# Patient Record
Sex: FEMALE | Race: WHITE | NOT HISPANIC OR LATINO | Employment: UNEMPLOYED | URBAN - NONMETROPOLITAN AREA
[De-identification: names, ages, dates, MRNs, and addresses within clinical notes are randomized per-mention and may not be internally consistent; named-entity substitution may affect disease eponyms.]

---

## 2017-02-02 LAB
EXTERNAL ABO GROUPING: NORMAL
EXTERNAL ANTIBODY SCREEN: NEGATIVE
EXTERNAL CHLAMYDIA SCREEN: NORMAL
EXTERNAL GONORRHEA SCREEN: NORMAL
EXTERNAL HEPATITIS B SURFACE ANTIGEN: NEGATIVE
EXTERNAL HEPATITIS C AB: NORMAL
EXTERNAL HERPES CULTURE: NORMAL
EXTERNAL RUBELLA QUALITATIVE: NORMAL
EXTERNAL SYPHILIS RPR SCREEN: NEGATIVE
HIV1 AB SPEC QL IA.RAPID: NEGATIVE

## 2017-02-09 ENCOUNTER — TRANSCRIBE ORDERS (OUTPATIENT)
Dept: ADMINISTRATIVE | Facility: HOSPITAL | Age: 27
End: 2017-02-09

## 2017-02-09 ENCOUNTER — APPOINTMENT (OUTPATIENT)
Dept: LAB | Facility: HOSPITAL | Age: 27
End: 2017-02-09
Attending: OBSTETRICS & GYNECOLOGY

## 2017-02-09 ENCOUNTER — TRANSCRIBE ORDERS (OUTPATIENT)
Dept: LABOR AND DELIVERY | Facility: HOSPITAL | Age: 27
End: 2017-02-09

## 2017-02-09 DIAGNOSIS — O36.80X0 PREGNANCY WITH INCONCLUSIVE FETAL VIABILITY, NOT APPLICABLE OR UNSPECIFIED FETUS: Primary | ICD-10-CM

## 2017-02-09 LAB — HCG INTACT+B SERPL-ACNC: 6025.4 MIU/ML (ref 0–10)

## 2017-02-09 PROCEDURE — 36415 COLL VENOUS BLD VENIPUNCTURE: CPT | Performed by: OBSTETRICS & GYNECOLOGY

## 2017-02-09 PROCEDURE — 84702 CHORIONIC GONADOTROPIN TEST: CPT | Performed by: OBSTETRICS & GYNECOLOGY

## 2017-02-13 ENCOUNTER — TRANSCRIBE ORDERS (OUTPATIENT)
Dept: ADMINISTRATIVE | Facility: HOSPITAL | Age: 27
End: 2017-02-13

## 2017-02-13 ENCOUNTER — APPOINTMENT (OUTPATIENT)
Dept: LAB | Facility: HOSPITAL | Age: 27
End: 2017-02-13
Attending: OBSTETRICS & GYNECOLOGY

## 2017-02-13 DIAGNOSIS — O36.80X0 PREGNANCY WITH INCONCLUSIVE FETAL VIABILITY, NOT APPLICABLE OR UNSPECIFIED FETUS: Primary | ICD-10-CM

## 2017-02-13 LAB — HCG INTACT+B SERPL-ACNC: ABNORMAL MIU/ML (ref 0–10)

## 2017-02-13 PROCEDURE — 36415 COLL VENOUS BLD VENIPUNCTURE: CPT | Performed by: OBSTETRICS & GYNECOLOGY

## 2017-02-13 PROCEDURE — 84702 CHORIONIC GONADOTROPIN TEST: CPT | Performed by: OBSTETRICS & GYNECOLOGY

## 2017-07-13 ENCOUNTER — TRANSCRIBE ORDERS (OUTPATIENT)
Dept: ADMINISTRATIVE | Facility: HOSPITAL | Age: 27
End: 2017-07-13

## 2017-07-13 ENCOUNTER — APPOINTMENT (OUTPATIENT)
Dept: LAB | Facility: HOSPITAL | Age: 27
End: 2017-07-13
Attending: OBSTETRICS & GYNECOLOGY

## 2017-07-13 DIAGNOSIS — O26.892 RH NEGATIVE STATUS DURING PREGNANCY IN SECOND TRIMESTER: Primary | ICD-10-CM

## 2017-07-13 DIAGNOSIS — Z67.91 RH NEGATIVE STATUS DURING PREGNANCY IN SECOND TRIMESTER: Primary | ICD-10-CM

## 2017-07-13 LAB
ABO GROUP BLD: NORMAL
BLD GP AB SCN SERPL QL: NEGATIVE
NUMBER OF DOSES: NORMAL
RH BLD: NEGATIVE

## 2017-07-13 PROCEDURE — 36415 COLL VENOUS BLD VENIPUNCTURE: CPT | Performed by: OBSTETRICS & GYNECOLOGY

## 2017-07-13 PROCEDURE — 86900 BLOOD TYPING SEROLOGIC ABO: CPT | Performed by: OBSTETRICS & GYNECOLOGY

## 2017-07-13 PROCEDURE — 86901 BLOOD TYPING SEROLOGIC RH(D): CPT | Performed by: OBSTETRICS & GYNECOLOGY

## 2017-07-13 PROCEDURE — 86850 RBC ANTIBODY SCREEN: CPT | Performed by: OBSTETRICS & GYNECOLOGY

## 2017-07-14 ENCOUNTER — HOSPITAL ENCOUNTER (OUTPATIENT)
Facility: HOSPITAL | Age: 27
Discharge: HOME | End: 2017-07-14
Attending: OBSTETRICS & GYNECOLOGY | Admitting: OBSTETRICS & GYNECOLOGY

## 2017-07-14 PROBLEM — Z29.13 NEED FOR RHOGAM DUE TO RH NEGATIVE MOTHER: Status: ACTIVE | Noted: 2017-07-14

## 2017-07-14 PROCEDURE — 25010000003 RHO D IMMUNE GLOBULIN 1500 UNITS SOLUTION PREFILLED SYRINGE: Performed by: OBSTETRICS & GYNECOLOGY

## 2017-07-14 PROCEDURE — G0378 HOSPITAL OBSERVATION PER HR: HCPCS

## 2017-07-14 PROCEDURE — 96372 THER/PROPH/DIAG INJ SC/IM: CPT

## 2017-07-14 RX ADMIN — HUMAN RHO(D) IMMUNE GLOBULIN 300 MCG: 300 INJECTION, SOLUTION INTRAMUSCULAR at 10:42

## 2017-09-07 LAB — EXTERNAL GROUP B STREP ANTIGEN: NORMAL

## 2017-09-14 ENCOUNTER — HOSPITAL ENCOUNTER (OUTPATIENT)
Facility: HOSPITAL | Age: 27
Setting detail: OBSERVATION
Discharge: HOME | End: 2017-09-14
Attending: OBSTETRICS & GYNECOLOGY | Admitting: OBSTETRICS & GYNECOLOGY

## 2017-09-14 VITALS
TEMPERATURE: 97.7 F | HEIGHT: 62 IN | RESPIRATION RATE: 16 BRPM | BODY MASS INDEX: 36.62 KG/M2 | WEIGHT: 199 LBS | HEART RATE: 91 BPM | DIASTOLIC BLOOD PRESSURE: 79 MMHG | SYSTOLIC BLOOD PRESSURE: 136 MMHG

## 2017-09-14 PROCEDURE — G0463 HOSPITAL OUTPT CLINIC VISIT: HCPCS

## 2017-09-14 PROCEDURE — 59025 FETAL NON-STRESS TEST: CPT

## 2017-09-14 PROCEDURE — G0378 HOSPITAL OBSERVATION PER HR: HCPCS

## 2017-09-14 RX ORDER — ACETAMINOPHEN 325 MG/1
650 TABLET ORAL EVERY 4 HOURS PRN
Status: CANCELLED | OUTPATIENT
Start: 2017-09-14

## 2017-09-14 RX ORDER — FERROUS SULFATE 325(65) MG
325 TABLET ORAL
COMMUNITY

## 2017-09-14 RX ORDER — VALACYCLOVIR HYDROCHLORIDE 500 MG/1
500 TABLET, FILM COATED ORAL DAILY
COMMUNITY
End: 2017-10-01 | Stop reason: HOSPADM

## 2017-09-14 RX ORDER — PRENATAL VIT/IRON FUM/FOLIC AC 27MG-0.8MG
TABLET ORAL DAILY
COMMUNITY

## 2017-09-14 RX ORDER — ACETAMINOPHEN 325 MG/1
650 TABLET ORAL EVERY 4 HOURS PRN
Status: DISCONTINUED | OUTPATIENT
Start: 2017-09-14 | End: 2017-09-14 | Stop reason: HOSPADM

## 2017-09-14 NOTE — NURSING NOTE
Sent from office for NST & serial BPs. FHTs 180s in office. EFM difficult to trace due to fetal activity

## 2017-09-14 NOTE — NON STRESS TEST
Sheila Yeung, a  at 36w0d with an LONG of 10/12/2017, by Patient Reported, was seen at [facility] LABOR DELIVERY for a nonstress test.    Chief Complaint   Patient presents with   • Hypertension     elevated fetal heart rate in the office       Interpretation A  Nonstress Test Interpretation A: Reactive (17 : Cydney Treadwell RN)  Comments A: verified by Brittaney Sheppard MD (17 : Cydney Treadwell RN)

## 2017-09-15 PROBLEM — Z34.90 PREGNANCY: Status: ACTIVE | Noted: 2017-09-15

## 2017-09-21 ENCOUNTER — HOSPITAL ENCOUNTER (OUTPATIENT)
Facility: HOSPITAL | Age: 27
Setting detail: OBSERVATION
Discharge: HOME | End: 2017-09-21
Attending: OBSTETRICS & GYNECOLOGY | Admitting: OBSTETRICS & GYNECOLOGY

## 2017-09-21 VITALS
RESPIRATION RATE: 20 BRPM | DIASTOLIC BLOOD PRESSURE: 84 MMHG | WEIGHT: 200 LBS | HEART RATE: 92 BPM | HEIGHT: 62 IN | SYSTOLIC BLOOD PRESSURE: 147 MMHG | BODY MASS INDEX: 36.8 KG/M2

## 2017-09-21 PROCEDURE — G0378 HOSPITAL OBSERVATION PER HR: HCPCS

## 2017-09-21 PROCEDURE — 59025 FETAL NON-STRESS TEST: CPT

## 2017-09-21 PROCEDURE — G0463 HOSPITAL OUTPT CLINIC VISIT: HCPCS

## 2017-09-21 RX ORDER — ACETAMINOPHEN 325 MG/1
650 TABLET ORAL EVERY 4 HOURS PRN
Status: CANCELLED | OUTPATIENT
Start: 2017-09-21

## 2017-09-25 ENCOUNTER — HOSPITAL ENCOUNTER (OUTPATIENT)
Facility: HOSPITAL | Age: 27
Setting detail: OBSERVATION
Discharge: HOME | End: 2017-09-25
Attending: OBSTETRICS & GYNECOLOGY | Admitting: OBSTETRICS & GYNECOLOGY

## 2017-09-25 VITALS — DIASTOLIC BLOOD PRESSURE: 84 MMHG | SYSTOLIC BLOOD PRESSURE: 159 MMHG | HEART RATE: 88 BPM

## 2017-09-25 RX ORDER — ACETAMINOPHEN 325 MG/1
650 TABLET ORAL EVERY 4 HOURS PRN
Status: DISCONTINUED | OUTPATIENT
Start: 2017-09-25 | End: 2017-09-25 | Stop reason: HOSPADM

## 2017-09-25 RX ORDER — ACETAMINOPHEN 325 MG/1
650 TABLET ORAL EVERY 4 HOURS PRN
Status: DISCONTINUED | OUTPATIENT
Start: 2017-09-25 | End: 2017-09-25 | Stop reason: SDUPTHER

## 2017-09-25 RX ORDER — ACETAMINOPHEN 325 MG/1
650 TABLET ORAL EVERY 4 HOURS PRN
Status: CANCELLED | OUTPATIENT
Start: 2017-09-25

## 2017-09-28 ENCOUNTER — ANESTHESIA (OUTPATIENT)
Dept: LABOR AND DELIVERY | Facility: HOSPITAL | Age: 27
End: 2017-09-28

## 2017-09-28 ENCOUNTER — ANESTHESIA EVENT (OUTPATIENT)
Dept: LABOR AND DELIVERY | Facility: HOSPITAL | Age: 27
End: 2017-09-28

## 2017-09-28 ENCOUNTER — HOSPITAL ENCOUNTER (INPATIENT)
Facility: HOSPITAL | Age: 27
LOS: 3 days | Discharge: HOME | End: 2017-10-01
Attending: OBSTETRICS & GYNECOLOGY | Admitting: OBSTETRICS & GYNECOLOGY

## 2017-09-28 DIAGNOSIS — Z3A.37 37 WEEKS GESTATION OF PREGNANCY: ICD-10-CM

## 2017-09-28 PROBLEM — O14.90 PREECLAMPSIA: Status: ACTIVE | Noted: 2017-09-28

## 2017-09-28 LAB
ABO GROUP BLD: NORMAL
ANISOCYTOSIS BLD QL: ABNORMAL
BASOPHILS # BLD MANUAL: 0.16 10*3/MM3 (ref 0–0.2)
BASOPHILS NFR BLD AUTO: 2 % (ref 0–2)
BLD GP AB SCN SERPL QL: POSITIVE
DEPRECATED RDW RBC AUTO: 39.5 FL (ref 40–54)
EOSINOPHIL # BLD MANUAL: 0.24 10*3/MM3 (ref 0–0.7)
EOSINOPHIL NFR BLD MANUAL: 3 % (ref 0–4)
ERYTHROCYTE [DISTWIDTH] IN BLOOD BY AUTOMATED COUNT: 13.2 % (ref 12–15)
HCT VFR BLD AUTO: 32.5 % (ref 37–47)
HGB BLD-MCNC: 10.9 G/DL (ref 12–16)
HYPOCHROMIA BLD QL: ABNORMAL
LYMPHOCYTES # BLD MANUAL: 0.98 10*3/MM3 (ref 0.72–4.86)
LYMPHOCYTES NFR BLD MANUAL: 12 % (ref 15–45)
LYMPHOCYTES NFR BLD MANUAL: 2 % (ref 4–12)
MCH RBC QN AUTO: 27.7 PG (ref 28–32)
MCHC RBC AUTO-ENTMCNC: 33.5 G/DL (ref 33–36)
MCV RBC AUTO: 82.5 FL (ref 82–98)
MONOCYTES # BLD AUTO: 0.16 10*3/MM3 (ref 0.19–1.3)
NEUTROPHILS # BLD AUTO: 6.6 10*3/MM3 (ref 1.87–8.4)
NEUTROPHILS NFR BLD MANUAL: 81 % (ref 39–78)
NRBC SPEC MANUAL: 1 /100 WBC (ref 0–0)
PLAT MORPH BLD: NORMAL
PLATELET # BLD AUTO: 146 10*3/MM3 (ref 130–400)
PMV BLD AUTO: 12.9 FL (ref 6–12)
RBC # BLD AUTO: 3.94 10*6/MM3 (ref 4.2–5.4)
RH BLD: NEGATIVE
WBC MORPH BLD: NORMAL
WBC NRBC COR # BLD: 8.15 10*3/MM3 (ref 4.8–10.8)

## 2017-09-28 PROCEDURE — 86870 RBC ANTIBODY IDENTIFICATION: CPT | Performed by: OBSTETRICS & GYNECOLOGY

## 2017-09-28 PROCEDURE — 25010000002 FENTANYL CITRATE (PF) 100 MCG/2ML SOLUTION: Performed by: NURSE ANESTHETIST, CERTIFIED REGISTERED

## 2017-09-28 PROCEDURE — 85027 COMPLETE CBC AUTOMATED: CPT | Performed by: OBSTETRICS & GYNECOLOGY

## 2017-09-28 PROCEDURE — C1765 ADHESION BARRIER: HCPCS | Performed by: OBSTETRICS & GYNECOLOGY

## 2017-09-28 PROCEDURE — 86850 RBC ANTIBODY SCREEN: CPT | Performed by: OBSTETRICS & GYNECOLOGY

## 2017-09-28 PROCEDURE — 25010000002 CEFEPIME: Performed by: OBSTETRICS & GYNECOLOGY

## 2017-09-28 PROCEDURE — 25010000002 ONDANSETRON PER 1 MG: Performed by: OBSTETRICS & GYNECOLOGY

## 2017-09-28 PROCEDURE — 88307 TISSUE EXAM BY PATHOLOGIST: CPT | Performed by: OBSTETRICS & GYNECOLOGY

## 2017-09-28 PROCEDURE — 86900 BLOOD TYPING SEROLOGIC ABO: CPT | Performed by: OBSTETRICS & GYNECOLOGY

## 2017-09-28 PROCEDURE — 25010000002 HYDROMORPHONE PER 4 MG: Performed by: NURSE ANESTHETIST, CERTIFIED REGISTERED

## 2017-09-28 PROCEDURE — 85007 BL SMEAR W/DIFF WBC COUNT: CPT | Performed by: OBSTETRICS & GYNECOLOGY

## 2017-09-28 PROCEDURE — 25010000002 MORPHINE SULFATE (PF) 2 MG/ML SOLUTION: Performed by: OBSTETRICS & GYNECOLOGY

## 2017-09-28 PROCEDURE — 86901 BLOOD TYPING SEROLOGIC RH(D): CPT | Performed by: OBSTETRICS & GYNECOLOGY

## 2017-09-28 PROCEDURE — 25010000003 CEFAZOLIN PER 500 MG: Performed by: OBSTETRICS & GYNECOLOGY

## 2017-09-28 RX ORDER — ONDANSETRON 4 MG/1
4 TABLET, FILM COATED ORAL EVERY 6 HOURS PRN
Status: DISCONTINUED | OUTPATIENT
Start: 2017-09-28 | End: 2017-10-01 | Stop reason: HOSPADM

## 2017-09-28 RX ORDER — PRENATAL VIT/IRON FUM/FOLIC AC 27MG-0.8MG
1 TABLET ORAL DAILY
Status: DISCONTINUED | OUTPATIENT
Start: 2017-09-29 | End: 2017-10-01 | Stop reason: HOSPADM

## 2017-09-28 RX ORDER — FENTANYL CITRATE 50 UG/ML
INJECTION, SOLUTION INTRAMUSCULAR; INTRAVENOUS AS NEEDED
Status: DISCONTINUED | OUTPATIENT
Start: 2017-09-28 | End: 2017-09-28 | Stop reason: SURG

## 2017-09-28 RX ORDER — PROMETHAZINE HYDROCHLORIDE 25 MG/1
12.5 TABLET ORAL EVERY 6 HOURS PRN
Status: DISCONTINUED | OUTPATIENT
Start: 2017-09-28 | End: 2017-09-29 | Stop reason: HOSPADM

## 2017-09-28 RX ORDER — TRISODIUM CITRATE DIHYDRATE AND CITRIC ACID MONOHYDRATE 500; 334 MG/5ML; MG/5ML
15 SOLUTION ORAL ONCE
Status: COMPLETED | OUTPATIENT
Start: 2017-09-28 | End: 2017-09-28

## 2017-09-28 RX ORDER — PROMETHAZINE HYDROCHLORIDE 25 MG/ML
12.5 INJECTION, SOLUTION INTRAMUSCULAR; INTRAVENOUS EVERY 6 HOURS PRN
Status: DISCONTINUED | OUTPATIENT
Start: 2017-09-28 | End: 2017-10-01 | Stop reason: HOSPADM

## 2017-09-28 RX ORDER — ONDANSETRON 2 MG/ML
4 INJECTION INTRAMUSCULAR; INTRAVENOUS EVERY 6 HOURS PRN
Status: DISCONTINUED | OUTPATIENT
Start: 2017-09-28 | End: 2017-10-01 | Stop reason: HOSPADM

## 2017-09-28 RX ORDER — PROMETHAZINE HYDROCHLORIDE 25 MG/ML
12.5 INJECTION, SOLUTION INTRAMUSCULAR; INTRAVENOUS EVERY 6 HOURS PRN
Status: DISCONTINUED | OUTPATIENT
Start: 2017-09-28 | End: 2017-09-28 | Stop reason: RX

## 2017-09-28 RX ORDER — CARBOPROST TROMETHAMINE 250 UG/ML
250 INJECTION, SOLUTION INTRAMUSCULAR AS NEEDED
Status: DISCONTINUED | OUTPATIENT
Start: 2017-09-28 | End: 2017-09-29 | Stop reason: HOSPADM

## 2017-09-28 RX ORDER — PROMETHAZINE HYDROCHLORIDE 12.5 MG/1
12.5 SUPPOSITORY RECTAL EVERY 6 HOURS PRN
Status: DISCONTINUED | OUTPATIENT
Start: 2017-09-28 | End: 2017-09-29 | Stop reason: HOSPADM

## 2017-09-28 RX ORDER — OXYCODONE HYDROCHLORIDE AND ACETAMINOPHEN 5; 325 MG/1; MG/1
1 TABLET ORAL EVERY 6 HOURS PRN
Status: DISCONTINUED | OUTPATIENT
Start: 2017-09-29 | End: 2017-10-01 | Stop reason: HOSPADM

## 2017-09-28 RX ORDER — MAGNESIUM SULFATE HEPTAHYDRATE 40 MG/ML
2 INJECTION, SOLUTION INTRAVENOUS CONTINUOUS
Status: DISPENSED | OUTPATIENT
Start: 2017-09-28 | End: 2017-09-30

## 2017-09-28 RX ORDER — FAMOTIDINE 10 MG/ML
20 INJECTION, SOLUTION INTRAVENOUS ONCE AS NEEDED
Status: COMPLETED | OUTPATIENT
Start: 2017-09-28 | End: 2017-09-28

## 2017-09-28 RX ORDER — PROMETHAZINE HYDROCHLORIDE 25 MG/1
25 TABLET ORAL EVERY 6 HOURS PRN
Status: DISCONTINUED | OUTPATIENT
Start: 2017-09-28 | End: 2017-10-01 | Stop reason: HOSPADM

## 2017-09-28 RX ORDER — MAGNESIUM SULFATE HEPTAHYDRATE 40 MG/ML
4 INJECTION, SOLUTION INTRAVENOUS ONCE
Status: COMPLETED | OUTPATIENT
Start: 2017-09-28 | End: 2017-09-28

## 2017-09-28 RX ORDER — OXYCODONE AND ACETAMINOPHEN 7.5; 325 MG/1; MG/1
2 TABLET ORAL EVERY 4 HOURS PRN
Status: DISPENSED | OUTPATIENT
Start: 2017-09-28 | End: 2017-09-29

## 2017-09-28 RX ORDER — ONDANSETRON 2 MG/ML
4 INJECTION INTRAMUSCULAR; INTRAVENOUS EVERY 6 HOURS PRN
Status: DISCONTINUED | OUTPATIENT
Start: 2017-09-28 | End: 2017-09-29 | Stop reason: HOSPADM

## 2017-09-28 RX ORDER — LIDOCAINE HYDROCHLORIDE 10 MG/ML
INJECTION, SOLUTION EPIDURAL; INFILTRATION; INTRACAUDAL; PERINEURAL AS NEEDED
Status: DISCONTINUED | OUTPATIENT
Start: 2017-09-28 | End: 2017-09-28 | Stop reason: SURG

## 2017-09-28 RX ORDER — BISACODYL 10 MG
10 SUPPOSITORY, RECTAL RECTAL DAILY PRN
Status: DISCONTINUED | OUTPATIENT
Start: 2017-09-28 | End: 2017-10-01 | Stop reason: HOSPADM

## 2017-09-28 RX ORDER — SENNA AND DOCUSATE SODIUM 50; 8.6 MG/1; MG/1
1 TABLET, FILM COATED ORAL 2 TIMES DAILY
Status: DISCONTINUED | OUTPATIENT
Start: 2017-09-29 | End: 2017-10-01 | Stop reason: HOSPADM

## 2017-09-28 RX ORDER — KETOROLAC TROMETHAMINE 30 MG/ML
30 INJECTION, SOLUTION INTRAMUSCULAR; INTRAVENOUS EVERY 6 HOURS
Status: COMPLETED | OUTPATIENT
Start: 2017-09-29 | End: 2017-09-29

## 2017-09-28 RX ORDER — LIDOCAINE HYDROCHLORIDE 10 MG/ML
5 INJECTION, SOLUTION INFILTRATION; PERINEURAL AS NEEDED
Status: DISCONTINUED | OUTPATIENT
Start: 2017-09-28 | End: 2017-09-29

## 2017-09-28 RX ORDER — SODIUM CHLORIDE 0.9 % (FLUSH) 0.9 %
1-10 SYRINGE (ML) INJECTION AS NEEDED
Status: DISCONTINUED | OUTPATIENT
Start: 2017-09-28 | End: 2017-10-01 | Stop reason: HOSPADM

## 2017-09-28 RX ORDER — SODIUM CHLORIDE 0.9 % (FLUSH) 0.9 %
1-10 SYRINGE (ML) INJECTION AS NEEDED
Status: DISCONTINUED | OUTPATIENT
Start: 2017-09-28 | End: 2017-09-29 | Stop reason: HOSPADM

## 2017-09-28 RX ORDER — ONDANSETRON 4 MG/1
4 TABLET, ORALLY DISINTEGRATING ORAL EVERY 6 HOURS PRN
Status: DISCONTINUED | OUTPATIENT
Start: 2017-09-28 | End: 2017-09-29 | Stop reason: HOSPADM

## 2017-09-28 RX ORDER — IBUPROFEN 800 MG/1
800 TABLET ORAL EVERY 8 HOURS PRN
Status: DISCONTINUED | OUTPATIENT
Start: 2017-09-30 | End: 2017-10-01 | Stop reason: HOSPADM

## 2017-09-28 RX ORDER — SODIUM CHLORIDE, SODIUM LACTATE, POTASSIUM CHLORIDE, CALCIUM CHLORIDE 600; 310; 30; 20 MG/100ML; MG/100ML; MG/100ML; MG/100ML
125 INJECTION, SOLUTION INTRAVENOUS CONTINUOUS
Status: DISCONTINUED | OUTPATIENT
Start: 2017-09-28 | End: 2017-09-28 | Stop reason: SDUPTHER

## 2017-09-28 RX ORDER — IBUPROFEN 800 MG/1
800 TABLET ORAL EVERY 8 HOURS PRN
Status: DISCONTINUED | OUTPATIENT
Start: 2017-09-28 | End: 2017-09-29 | Stop reason: HOSPADM

## 2017-09-28 RX ORDER — MISOPROSTOL 200 UG/1
800 TABLET ORAL ONCE AS NEEDED
Status: ACTIVE | OUTPATIENT
Start: 2017-09-28 | End: 2017-09-29

## 2017-09-28 RX ORDER — BISACODYL 5 MG/1
10 TABLET, DELAYED RELEASE ORAL DAILY PRN
Status: DISCONTINUED | OUTPATIENT
Start: 2017-09-28 | End: 2017-10-01 | Stop reason: HOSPADM

## 2017-09-28 RX ORDER — ONDANSETRON 4 MG/1
4 TABLET, FILM COATED ORAL EVERY 6 HOURS PRN
Status: DISCONTINUED | OUTPATIENT
Start: 2017-09-28 | End: 2017-09-29 | Stop reason: HOSPADM

## 2017-09-28 RX ORDER — OXYCODONE AND ACETAMINOPHEN 7.5; 325 MG/1; MG/1
1 TABLET ORAL EVERY 4 HOURS PRN
Status: DISPENSED | OUTPATIENT
Start: 2017-09-28 | End: 2017-09-29

## 2017-09-28 RX ORDER — SODIUM CHLORIDE 0.9 % (FLUSH) 0.9 %
1-10 SYRINGE (ML) INJECTION AS NEEDED
Status: DISCONTINUED | OUTPATIENT
Start: 2017-09-28 | End: 2017-09-28 | Stop reason: SDUPTHER

## 2017-09-28 RX ORDER — CARBOPROST TROMETHAMINE 250 UG/ML
250 INJECTION, SOLUTION INTRAMUSCULAR
Status: ACTIVE | OUTPATIENT
Start: 2017-09-28 | End: 2017-09-29

## 2017-09-28 RX ORDER — SODIUM CHLORIDE, SODIUM LACTATE, POTASSIUM CHLORIDE, CALCIUM CHLORIDE 600; 310; 30; 20 MG/100ML; MG/100ML; MG/100ML; MG/100ML
125 INJECTION, SOLUTION INTRAVENOUS CONTINUOUS
Status: DISCONTINUED | OUTPATIENT
Start: 2017-09-29 | End: 2017-10-01 | Stop reason: HOSPADM

## 2017-09-28 RX ORDER — OXYCODONE HYDROCHLORIDE AND ACETAMINOPHEN 5; 325 MG/1; MG/1
2 TABLET ORAL EVERY 6 HOURS PRN
Status: DISCONTINUED | OUTPATIENT
Start: 2017-09-29 | End: 2017-10-01 | Stop reason: HOSPADM

## 2017-09-28 RX ORDER — METHYLERGONOVINE MALEATE 0.2 MG/ML
200 INJECTION INTRAVENOUS AS NEEDED
Status: DISCONTINUED | OUTPATIENT
Start: 2017-09-28 | End: 2017-09-29 | Stop reason: HOSPADM

## 2017-09-28 RX ORDER — HYDROMORPHONE HCL 110MG/55ML
PATIENT CONTROLLED ANALGESIA SYRINGE INTRAVENOUS AS NEEDED
Status: DISCONTINUED | OUTPATIENT
Start: 2017-09-28 | End: 2017-09-28 | Stop reason: SURG

## 2017-09-28 RX ORDER — BUTORPHANOL TARTRATE 1 MG/ML
0.5 INJECTION, SOLUTION INTRAMUSCULAR; INTRAVENOUS EVERY 6 HOURS PRN
Status: DISCONTINUED | OUTPATIENT
Start: 2017-09-28 | End: 2017-10-01 | Stop reason: HOSPADM

## 2017-09-28 RX ORDER — ONDANSETRON 4 MG/1
4 TABLET, ORALLY DISINTEGRATING ORAL EVERY 6 HOURS PRN
Status: DISCONTINUED | OUTPATIENT
Start: 2017-09-28 | End: 2017-10-01 | Stop reason: HOSPADM

## 2017-09-28 RX ORDER — DOCUSATE SODIUM 100 MG/1
100 CAPSULE, LIQUID FILLED ORAL 2 TIMES DAILY PRN
Status: DISCONTINUED | OUTPATIENT
Start: 2017-09-28 | End: 2017-10-01 | Stop reason: HOSPADM

## 2017-09-28 RX ORDER — MORPHINE SULFATE 2 MG/ML
2 INJECTION, SOLUTION INTRAMUSCULAR; INTRAVENOUS
Status: DISCONTINUED | OUTPATIENT
Start: 2017-09-28 | End: 2017-09-29 | Stop reason: ALTCHOICE

## 2017-09-28 RX ORDER — OXYTOCIN 10 [USP'U]/ML
INJECTION, SOLUTION INTRAMUSCULAR; INTRAVENOUS AS NEEDED
Status: DISCONTINUED | OUTPATIENT
Start: 2017-09-28 | End: 2017-09-28 | Stop reason: SURG

## 2017-09-28 RX ORDER — SODIUM CHLORIDE, SODIUM LACTATE, POTASSIUM CHLORIDE, CALCIUM CHLORIDE 600; 310; 30; 20 MG/100ML; MG/100ML; MG/100ML; MG/100ML
125 INJECTION, SOLUTION INTRAVENOUS CONTINUOUS
Status: DISCONTINUED | OUTPATIENT
Start: 2017-09-28 | End: 2017-09-29 | Stop reason: HOSPADM

## 2017-09-28 RX ORDER — BUPIVACAINE HYDROCHLORIDE 7.5 MG/ML
INJECTION, SOLUTION EPIDURAL; RETROBULBAR AS NEEDED
Status: DISCONTINUED | OUTPATIENT
Start: 2017-09-28 | End: 2017-09-28 | Stop reason: SURG

## 2017-09-28 RX ORDER — NALBUPHINE HCL 10 MG/ML
2.5 AMPUL (ML) INJECTION EVERY 4 HOURS PRN
Status: ACTIVE | OUTPATIENT
Start: 2017-09-28 | End: 2017-09-29

## 2017-09-28 RX ORDER — POLYETHYLENE GLYCOL 3350 17 G/17G
17 POWDER, FOR SOLUTION ORAL DAILY
Status: DISCONTINUED | OUTPATIENT
Start: 2017-09-29 | End: 2017-10-01 | Stop reason: HOSPADM

## 2017-09-28 RX ORDER — METHYLERGONOVINE MALEATE 0.2 MG/ML
200 INJECTION INTRAVENOUS
Status: ACTIVE | OUTPATIENT
Start: 2017-09-28 | End: 2017-09-29

## 2017-09-28 RX ORDER — PROMETHAZINE HYDROCHLORIDE 12.5 MG/1
12.5 SUPPOSITORY RECTAL EVERY 6 HOURS PRN
Status: DISCONTINUED | OUTPATIENT
Start: 2017-09-28 | End: 2017-10-01 | Stop reason: HOSPADM

## 2017-09-28 RX ORDER — MISOPROSTOL 200 UG/1
800 TABLET ORAL AS NEEDED
Status: DISCONTINUED | OUTPATIENT
Start: 2017-09-28 | End: 2017-09-29 | Stop reason: HOSPADM

## 2017-09-28 RX ORDER — NALOXONE HCL 0.4 MG/ML
0.4 VIAL (ML) INJECTION
Status: ACTIVE | OUTPATIENT
Start: 2017-09-28 | End: 2017-09-29

## 2017-09-28 RX ORDER — HYDROCODONE BITARTRATE AND ACETAMINOPHEN 5; 325 MG/1; MG/1
1 TABLET ORAL EVERY 4 HOURS PRN
Status: DISCONTINUED | OUTPATIENT
Start: 2017-09-28 | End: 2017-09-29 | Stop reason: ALTCHOICE

## 2017-09-28 RX ADMIN — SODIUM CHLORIDE, POTASSIUM CHLORIDE, SODIUM LACTATE AND CALCIUM CHLORIDE: 600; 310; 30; 20 INJECTION, SOLUTION INTRAVENOUS at 20:25

## 2017-09-28 RX ADMIN — HYDROMORPHONE HYDROCHLORIDE 200 MCG: 2 INJECTION, SOLUTION INTRAMUSCULAR; INTRAVENOUS; SUBCUTANEOUS at 19:58

## 2017-09-28 RX ADMIN — METRONIDAZOLE 500 MG: 500 INJECTION, SOLUTION INTRAVENOUS at 23:15

## 2017-09-28 RX ADMIN — CEFEPIME 2 G: 2 INJECTION, POWDER, FOR SOLUTION INTRAVENOUS at 22:35

## 2017-09-28 RX ADMIN — FENTANYL CITRATE 20 MCG: 50 INJECTION, SOLUTION INTRAMUSCULAR; INTRAVENOUS at 19:58

## 2017-09-28 RX ADMIN — CEFAZOLIN SODIUM 2 G: 2 SOLUTION INTRAVENOUS at 19:55

## 2017-09-28 RX ADMIN — OXYTOCIN 20 UNITS: 10 INJECTION INTRAVENOUS at 20:25

## 2017-09-28 RX ADMIN — MAGNESIUM SULFATE HEPTAHYDRATE 4 G: 40 INJECTION, SOLUTION INTRAVENOUS at 21:22

## 2017-09-28 RX ADMIN — SODIUM CHLORIDE, POTASSIUM CHLORIDE, SODIUM LACTATE AND CALCIUM CHLORIDE: 600; 310; 30; 20 INJECTION, SOLUTION INTRAVENOUS at 19:50

## 2017-09-28 RX ADMIN — OXYTOCIN 30 UNITS: 10 INJECTION INTRAVENOUS at 20:14

## 2017-09-28 RX ADMIN — SODIUM CITRATE AND CITRIC ACID MONOHYDRATE 15 ML: 500; 334 SOLUTION ORAL at 19:42

## 2017-09-28 RX ADMIN — SODIUM CHLORIDE, POTASSIUM CHLORIDE, SODIUM LACTATE AND CALCIUM CHLORIDE 1000 ML: 600; 310; 30; 20 INJECTION, SOLUTION INTRAVENOUS at 17:35

## 2017-09-28 RX ADMIN — SODIUM CHLORIDE, POTASSIUM CHLORIDE, SODIUM LACTATE AND CALCIUM CHLORIDE 75 ML/HR: 600; 310; 30; 20 INJECTION, SOLUTION INTRAVENOUS at 22:34

## 2017-09-28 RX ADMIN — MORPHINE SULFATE 2 MG: 2 INJECTION, SOLUTION INTRAMUSCULAR; INTRAVENOUS at 22:41

## 2017-09-28 RX ADMIN — LIDOCAINE HYDROCHLORIDE 3 ML: 10 INJECTION, SOLUTION EPIDURAL; INFILTRATION; INTRACAUDAL; PERINEURAL at 19:56

## 2017-09-28 RX ADMIN — FAMOTIDINE 20 MG: 10 INJECTION, SOLUTION INTRAVENOUS at 19:42

## 2017-09-28 RX ADMIN — BUPIVACAINE HYDROCHLORIDE 1.7 ML: 7.5 INJECTION, SOLUTION EPIDURAL; RETROBULBAR at 19:58

## 2017-09-28 RX ADMIN — ONDANSETRON 4 MG: 2 INJECTION INTRAMUSCULAR; INTRAVENOUS at 21:42

## 2017-09-28 NOTE — ANESTHESIA PREPROCEDURE EVALUATION
Anesthesia Evaluation     no history of anesthetic complications:  NPO Solid Status: > 6 hours  NPO Liquid Status: > 2 hours     Airway   Mallampati: I  TM distance: >3 FB  Neck ROM: full  no difficulty expected  Dental - normal exam     Pulmonary - normal exam   Cardiovascular - normal exam  Exercise tolerance: excellent (>7 METS)    (+) hypertension poorly controlled,       Neuro/Psych- negative ROS  GI/Hepatic/Renal/Endo    (+) obesity,      Musculoskeletal (-) negative ROS    Abdominal  - normal exam   Substance History - negative use     OB/GYN    (+) Pregnant, Preeclampsia, pregnancy induced hypertension        Other - negative ROS                                       Anesthesia Plan    ASA 2     spinal     Anesthetic plan and risks discussed with patient and spouse/significant other.

## 2017-09-29 LAB
ABO GROUP BLD: NORMAL
BASOPHILS # BLD AUTO: 0.01 10*3/MM3 (ref 0–0.2)
BASOPHILS NFR BLD AUTO: 0.1 % (ref 0–2)
BLD GP AB SCN SERPL QL: NEGATIVE
DEPRECATED RDW RBC AUTO: 39.2 FL (ref 40–54)
EOSINOPHIL # BLD AUTO: 0.01 10*3/MM3 (ref 0–0.7)
EOSINOPHIL NFR BLD AUTO: 0.1 % (ref 0–4)
ERYTHROCYTE [DISTWIDTH] IN BLOOD BY AUTOMATED COUNT: 13.1 % (ref 12–15)
FETAL BLEED: NEGATIVE
HCT VFR BLD AUTO: 28.4 % (ref 37–47)
HGB BLD-MCNC: 9.6 G/DL (ref 12–16)
IMM GRANULOCYTES # BLD: 0.08 10*3/MM3 (ref 0–0.03)
IMM GRANULOCYTES NFR BLD: 0.7 % (ref 0–5)
LYMPHOCYTES # BLD AUTO: 0.83 10*3/MM3 (ref 0.72–4.86)
LYMPHOCYTES NFR BLD AUTO: 7.6 % (ref 15–45)
MAGNESIUM SERPL-MCNC: 5.8 MG/DL (ref 1.4–2.2)
MCH RBC QN AUTO: 27.7 PG (ref 28–32)
MCHC RBC AUTO-ENTMCNC: 33.8 G/DL (ref 33–36)
MCV RBC AUTO: 81.8 FL (ref 82–98)
MONOCYTES # BLD AUTO: 0.51 10*3/MM3 (ref 0.19–1.3)
MONOCYTES NFR BLD AUTO: 4.7 % (ref 4–12)
NEUTROPHILS # BLD AUTO: 9.47 10*3/MM3 (ref 1.87–8.4)
NEUTROPHILS NFR BLD AUTO: 86.8 % (ref 39–78)
NUMBER OF DOSES: NORMAL
PLATELET # BLD AUTO: 142 10*3/MM3 (ref 130–400)
PMV BLD AUTO: 12.5 FL (ref 6–12)
RBC # BLD AUTO: 3.47 10*6/MM3 (ref 4.2–5.4)
RESIDUAL RHIG DETECTED: NORMAL
RH BLD: NEGATIVE
WBC NRBC COR # BLD: 10.91 10*3/MM3 (ref 4.8–10.8)

## 2017-09-29 PROCEDURE — 86850 RBC ANTIBODY SCREEN: CPT

## 2017-09-29 PROCEDURE — 83735 ASSAY OF MAGNESIUM: CPT | Performed by: OBSTETRICS & GYNECOLOGY

## 2017-09-29 PROCEDURE — 86900 BLOOD TYPING SEROLOGIC ABO: CPT | Performed by: OBSTETRICS & GYNECOLOGY

## 2017-09-29 PROCEDURE — 51703 INSERT BLADDER CATH COMPLEX: CPT

## 2017-09-29 PROCEDURE — 25010000002 CEFEPIME: Performed by: OBSTETRICS & GYNECOLOGY

## 2017-09-29 PROCEDURE — 85025 COMPLETE CBC W/AUTO DIFF WBC: CPT | Performed by: OBSTETRICS & GYNECOLOGY

## 2017-09-29 PROCEDURE — 85461 HEMOGLOBIN FETAL: CPT | Performed by: OBSTETRICS & GYNECOLOGY

## 2017-09-29 PROCEDURE — 94799 UNLISTED PULMONARY SVC/PX: CPT

## 2017-09-29 PROCEDURE — 25010000003 RHO D IMMUNE GLOBULIN 1500 UNITS SOLUTION PREFILLED SYRINGE: Performed by: OBSTETRICS & GYNECOLOGY

## 2017-09-29 PROCEDURE — 25010000002 ONDANSETRON PER 1 MG: Performed by: OBSTETRICS & GYNECOLOGY

## 2017-09-29 PROCEDURE — 25010000002 KETOROLAC TROMETHAMINE PER 15 MG: Performed by: OBSTETRICS & GYNECOLOGY

## 2017-09-29 PROCEDURE — 86901 BLOOD TYPING SEROLOGIC RH(D): CPT | Performed by: OBSTETRICS & GYNECOLOGY

## 2017-09-29 RX ORDER — CALCIUM GLUCONATE 94 MG/ML
1 INJECTION, SOLUTION INTRAVENOUS AS NEEDED
Status: DISCONTINUED | OUTPATIENT
Start: 2017-09-29 | End: 2017-10-01 | Stop reason: HOSPADM

## 2017-09-29 RX ADMIN — CEFEPIME 2 G: 2 INJECTION, POWDER, FOR SOLUTION INTRAVENOUS at 22:34

## 2017-09-29 RX ADMIN — KETOROLAC TROMETHAMINE 30 MG: 30 INJECTION, SOLUTION INTRAMUSCULAR at 06:11

## 2017-09-29 RX ADMIN — METRONIDAZOLE 500 MG: 500 INJECTION, SOLUTION INTRAVENOUS at 11:15

## 2017-09-29 RX ADMIN — HUMAN RHO(D) IMMUNE GLOBULIN 300 MCG: 300 INJECTION, SOLUTION INTRAMUSCULAR at 14:23

## 2017-09-29 RX ADMIN — KETOROLAC TROMETHAMINE 30 MG: 30 INJECTION, SOLUTION INTRAMUSCULAR at 20:00

## 2017-09-29 RX ADMIN — SODIUM CHLORIDE, POTASSIUM CHLORIDE, SODIUM LACTATE AND CALCIUM CHLORIDE 125 ML/HR: 600; 310; 30; 20 INJECTION, SOLUTION INTRAVENOUS at 00:15

## 2017-09-29 RX ADMIN — METRONIDAZOLE 500 MG: 500 INJECTION, SOLUTION INTRAVENOUS at 19:05

## 2017-09-29 RX ADMIN — ONDANSETRON 4 MG: 2 INJECTION INTRAMUSCULAR; INTRAVENOUS at 08:58

## 2017-09-29 RX ADMIN — CEFEPIME 2 G: 2 INJECTION, POWDER, FOR SOLUTION INTRAVENOUS at 06:11

## 2017-09-29 RX ADMIN — OXYCODONE HYDROCHLORIDE AND ACETAMINOPHEN 2 TABLET: 7.5; 325 TABLET ORAL at 21:39

## 2017-09-29 RX ADMIN — CEFEPIME 2 G: 2 INJECTION, POWDER, FOR SOLUTION INTRAVENOUS at 14:23

## 2017-09-29 RX ADMIN — KETOROLAC TROMETHAMINE 30 MG: 30 INJECTION, SOLUTION INTRAMUSCULAR at 00:08

## 2017-09-29 RX ADMIN — OXYCODONE HYDROCHLORIDE AND ACETAMINOPHEN 2 TABLET: 7.5; 325 TABLET ORAL at 14:22

## 2017-09-29 RX ADMIN — ONDANSETRON 4 MG: 2 INJECTION INTRAMUSCULAR; INTRAVENOUS at 15:20

## 2017-09-29 RX ADMIN — MAGNESIUM SULFATE HEPTAHYDRATE 2 G/HR: 40 INJECTION, SOLUTION INTRAVENOUS at 15:22

## 2017-09-29 RX ADMIN — MAGNESIUM SULFATE HEPTAHYDRATE 2 G/HR: 40 INJECTION, SOLUTION INTRAVENOUS at 05:07

## 2017-09-29 RX ADMIN — METRONIDAZOLE 500 MG: 500 INJECTION, SOLUTION INTRAVENOUS at 05:07

## 2017-09-29 RX ADMIN — KETOROLAC TROMETHAMINE 30 MG: 30 INJECTION, SOLUTION INTRAMUSCULAR at 12:09

## 2017-09-29 RX ADMIN — OXYCODONE HYDROCHLORIDE AND ACETAMINOPHEN 1 TABLET: 7.5; 325 TABLET ORAL at 06:11

## 2017-09-29 RX ADMIN — DOCUSATE SODIUM -SENNOSIDES 1 TABLET: 50; 8.6 TABLET, COATED ORAL at 21:39

## 2017-09-29 NOTE — ANESTHESIA POSTPROCEDURE EVALUATION
Patient: Sheila Yeung    Procedure Summary     Date Anesthesia Start Anesthesia Stop Room / Location    17 [facility] LABOR DELIVERY 2 /  [facility] LABOR DELIVERY       Procedure Diagnosis Surgeon Provider     SECTION REPEAT (Bilateral Abdomen) No diagnosis on file. MD Ole Reece CRNA          Anesthesia Type: spinal  Last vitals  BP   146/84 (17 1319)    Temp   97.8 °F (36.6 °C) (17 1319)    Pulse   89 (17 1319)   Resp        SpO2          Post Anesthesia Care and Evaluation    Patient location during evaluation: PACU  Patient participation: complete - patient participated  Level of consciousness: awake and alert  Pain score: 1  Pain management: adequate  Airway patency: patent  Anesthetic complications: No anesthetic complications    Cardiovascular status: acceptable  Respiratory status: room air  Hydration status: acceptable  Post Neuraxial Block status: Motor and sensory function returned to baseline and No signs or symptoms of PDPH  Blood pressure 146/84, pulse 89, temperature 97.8 °F (36.6 °C), temperature source Temporal Artery , resp. rate 18, SpO2 98 %, unknown if currently breastfeeding.

## 2017-09-29 NOTE — ANESTHESIA PROCEDURE NOTES
Spinal Block    Patient location during procedure: OB  Start Time: 9/28/2017 7:52 PM  Stop Time: 9/28/2017 7:58 PM  Indication:at surgeon's request, post-op pain management and procedure for pain  Performed By  CRNA: NYA GASPAR  Preanesthetic Checklist  Completed: patient identified, site marked, surgical consent, pre-op evaluation, timeout performed, IV checked, risks and benefits discussed and monitors and equipment checked  Spinal Block Prep:  Patient Position:sitting  Sterile Tech:cap, gloves, mask and sterile barriers  Prep:DuraPrep  Patient Monitoring:continuous pulse oximetry, blood pressure monitoring and EKG  Spinal Block Procedure  Approach:midline  Guidance:landmark technique and palpation technique  Location:L4-L5  Needle Type:Sprotte  Needle Gauge:25 G  Placement of Spinal needle event:cerebrospinal fluid aspirated  Paresthesia: no  Fluid Appearance:clear  Post Assessment  Patient Tolerance:patient tolerated the procedure well with no apparent complications  Complications no

## 2017-09-30 PROCEDURE — 90715 TDAP VACCINE 7 YRS/> IM: CPT | Performed by: OBSTETRICS & GYNECOLOGY

## 2017-09-30 PROCEDURE — 25010000002 TDAP 5-2.5-18.5 LF-MCG/0.5 SUSPENSION: Performed by: OBSTETRICS & GYNECOLOGY

## 2017-09-30 PROCEDURE — 90471 IMMUNIZATION ADMIN: CPT | Performed by: OBSTETRICS & GYNECOLOGY

## 2017-09-30 RX ADMIN — TETANUS TOXOID, REDUCED DIPHTHERIA TOXOID AND ACELLULAR PERTUSSIS VACCINE, ADSORBED 0.5 ML: 5; 2.5; 8; 8; 2.5 SUSPENSION INTRAMUSCULAR at 06:27

## 2017-09-30 RX ADMIN — IBUPROFEN 800 MG: 800 TABLET, FILM COATED ORAL at 06:26

## 2017-09-30 RX ADMIN — OXYCODONE HYDROCHLORIDE AND ACETAMINOPHEN 2 TABLET: 5; 325 TABLET ORAL at 06:27

## 2017-09-30 RX ADMIN — DOCUSATE SODIUM -SENNOSIDES 1 TABLET: 50; 8.6 TABLET, COATED ORAL at 10:28

## 2017-09-30 RX ADMIN — OXYCODONE HYDROCHLORIDE AND ACETAMINOPHEN 2 TABLET: 5; 325 TABLET ORAL at 19:01

## 2017-09-30 RX ADMIN — POLYETHYLENE GLYCOL 3350 17 G: 17 POWDER, FOR SOLUTION ORAL at 10:28

## 2017-09-30 RX ADMIN — IBUPROFEN 800 MG: 800 TABLET, FILM COATED ORAL at 15:07

## 2017-09-30 RX ADMIN — OXYCODONE HYDROCHLORIDE AND ACETAMINOPHEN 2 TABLET: 5; 325 TABLET ORAL at 12:44

## 2017-09-30 RX ADMIN — DOCUSATE SODIUM -SENNOSIDES 1 TABLET: 50; 8.6 TABLET, COATED ORAL at 20:30

## 2017-09-30 RX ADMIN — PRENATAL VIT W/ FE FUMARATE-FA TAB 27-0.8 MG 1 TABLET: 27-0.8 TAB at 10:28

## 2017-10-01 VITALS
SYSTOLIC BLOOD PRESSURE: 147 MMHG | BODY MASS INDEX: 36.47 KG/M2 | WEIGHT: 198.2 LBS | HEART RATE: 77 BPM | RESPIRATION RATE: 19 BRPM | DIASTOLIC BLOOD PRESSURE: 88 MMHG | HEIGHT: 62 IN | TEMPERATURE: 97.9 F | OXYGEN SATURATION: 99 %

## 2017-10-01 PROBLEM — Z34.90 PREGNANCY: Status: RESOLVED | Noted: 2017-09-15 | Resolved: 2017-10-01

## 2017-10-01 PROBLEM — Z29.13 NEED FOR RHOGAM DUE TO RH NEGATIVE MOTHER: Status: RESOLVED | Noted: 2017-07-14 | Resolved: 2017-10-01

## 2017-10-01 PROBLEM — O14.90 PREECLAMPSIA: Status: RESOLVED | Noted: 2017-09-28 | Resolved: 2017-10-01

## 2017-10-01 PROCEDURE — 94799 UNLISTED PULMONARY SVC/PX: CPT

## 2017-10-01 RX ORDER — OXYCODONE HYDROCHLORIDE AND ACETAMINOPHEN 5; 325 MG/1; MG/1
1 TABLET ORAL EVERY 6 HOURS PRN
Qty: 50 TABLET | Refills: 0 | Status: SHIPPED | OUTPATIENT
Start: 2017-10-01 | End: 2017-10-09

## 2017-10-01 RX ORDER — IBUPROFEN 800 MG/1
800 TABLET ORAL EVERY 8 HOURS PRN
Qty: 90 TABLET | Refills: 2 | Status: SHIPPED | OUTPATIENT
Start: 2017-10-01

## 2017-10-01 RX ADMIN — OXYCODONE HYDROCHLORIDE AND ACETAMINOPHEN 2 TABLET: 5; 325 TABLET ORAL at 13:53

## 2017-10-01 RX ADMIN — OXYCODONE HYDROCHLORIDE AND ACETAMINOPHEN 2 TABLET: 5; 325 TABLET ORAL at 19:53

## 2017-10-01 RX ADMIN — PRENATAL VIT W/ FE FUMARATE-FA TAB 27-0.8 MG 1 TABLET: 27-0.8 TAB at 07:38

## 2017-10-01 RX ADMIN — IBUPROFEN 800 MG: 800 TABLET, FILM COATED ORAL at 18:28

## 2017-10-01 RX ADMIN — IBUPROFEN 800 MG: 800 TABLET, FILM COATED ORAL at 09:58

## 2017-10-01 RX ADMIN — MILK OF MAGNESIA 10 ML: 2400 CONCENTRATE ORAL at 07:40

## 2017-10-01 RX ADMIN — OXYCODONE HYDROCHLORIDE AND ACETAMINOPHEN 2 TABLET: 5; 325 TABLET ORAL at 01:06

## 2017-10-01 RX ADMIN — OXYCODONE HYDROCHLORIDE AND ACETAMINOPHEN 2 TABLET: 5; 325 TABLET ORAL at 07:37

## 2017-10-01 RX ADMIN — DOCUSATE SODIUM -SENNOSIDES 1 TABLET: 50; 8.6 TABLET, COATED ORAL at 07:37

## 2017-10-01 RX ADMIN — POLYETHYLENE GLYCOL 3350 17 G: 17 POWDER, FOR SOLUTION ORAL at 07:38

## 2017-10-01 RX ADMIN — IBUPROFEN 800 MG: 800 TABLET, FILM COATED ORAL at 01:06

## 2017-10-01 NOTE — DISCHARGE SUMMARY
Discharge [facility]    [facility]  : 1990  MRN: [id number]  CSN: [id number]    Date of Admission: 2017   Date of Discharge:  10/1/2017   Delivering Physician: Brittaney Sheppard        Admission Diagnosis: 1. Preeclampsia, third trimester [O14.93]   Discharge Diagnosis: 1. Pregnancy at 38w0d - delivered       Procedures: 2017  - , Low Transverse       Hospital Course  Patient is a 26 y.o.  who at 38w0d had a  section.  Her postoperative course was without complications.  On POD #2 she was ready for discharge.  She had normal lochia and pain well controlled with oral medications.    Infant  female  fetus weighing 6 lb 12.3 oz (3070 g)   Apgars -  8  @ 1 minute /  9  @ 5 minutes.    Discharge labs  Lab Results   Component Value Date    WBC 10.91 (H) 2017    HGB 9.6 (L) 2017    HCT 28.4 (L) 2017     2017       Discharge Medications   Sheila Yeung   Home Medication Instructions ARRON:[id number]    Printed on:10/01/17 1025   Medication Information                      ferrous sulfate 325 (65 FE) MG tablet  Take 325 mg by mouth Daily With Breakfast.             Prenatal Vit-Fe Fumarate-FA (PRENATAL VITAMIN 27-0.8) 27-0.8 MG tablet tablet  Take  by mouth Daily.             valACYclovir (VALTREX) 500 MG tablet  Take 500 mg by mouth Daily.                 Discharge Disposition    Condition on Discharge: good   Follow-up: 1 week with MD Brittaney Reece MD  10/1/2017

## 2017-10-01 NOTE — LACTATION NOTE
This note was copied from a baby's chart.  Lactation visit complete.2 day old infant, 6.6 % weight loss, Breast fed x11, No supplementation, 3 urines, 4 stools yesterday 9/29/17.  Mom states infant has not breast fed well since 7 am. Instructed mom on waking techniques, assisted mom with waking infant. Infant latched without difficulty in football position, stimulated infant to keep infant at the breast suckling, infant breast fed well for 17 mins on left, burped infant, assisted mom with latching infant in football position on the right infant breast fed 12 minutes on right without difficulty, infant released breast, sleeping. Placed infant skin to skin with mom with hat on and back side covered. Education reviewed with mom, Breastfeeding a Great start book given/reviewed with mom, mom asked appropriate questions, denies any questions or concerns at this time. Provide support and encouragement.

## 2017-10-01 NOTE — PLAN OF CARE
Problem: Patient Care Overview (Adult)  Goal: Plan of Care Review  Outcome: Ongoing (interventions implemented as appropriate)    10/01/17 0555   Coping/Psychosocial Response Interventions   Plan Of Care Reviewed With patient   Patient Care Overview   Progress improving   Ff,ml,u2 scant lochia with no clots nor odor. Voiding and ambulating. Passing flatus. VSS. Reflexes 2+, mild edema. Plans for home today. Pain controlled with oral meds.  Goal: Adult Individualization and Mutuality  Outcome: Ongoing (interventions implemented as appropriate)  Goal: Discharge Needs Assessment  Outcome: Ongoing (interventions implemented as appropriate)    Problem: Postpartum ( Delivery) (Adult)  Goal: Signs and Symptoms of Listed Potential Problems Will be Absent or Manageable (Postpartum)  Outcome: Ongoing (interventions implemented as appropriate)    Problem: Hypertensive Disorders in Pregnancy (Adult,Obstetrics,Pediatric)  Goal: Signs and Symptoms of Listed Potential Problems Will be Absent or Manageable (Hypertensive Disorders in Pregnancy)  Outcome: Ongoing (interventions implemented as appropriate)    Problem: Breastfeeding (Adult,NICU,[place],Obstetrics,Pediatric)  Goal: Signs and Symptoms of Listed Potential Problems Will be Absent or Manageable (Breastfeeding)  Outcome: Ongoing (interventions implemented as appropriate)

## 2017-10-01 NOTE — PROGRESS NOTES
"[facility]   Section Postpartum Delivery Progress Note    Subjective   Postpartum Day 3:  Delivery    The patient feels well.  Her pain is well controlled with prescribed pain medications.   She is ambulating well.  Patient describes her bleeding as no bleeding.    Breastfeeding: without difficulty.    Objective     Vital Signs Range for the last 24 hours  Temperature: Temp:  [97.4 °F (36.3 °C)-98.7 °F (37.1 °C)] 98.4 °F (36.9 °C)   Temp Source: Temp src: Temporal Artery    BP: BP: (135-152)/(71-91) 152/85   Pulse: Heart Rate:  [82-85] 82   Respirations: Resp:  [16-18] 17   SPO2: SpO2:  [98 %-99 %] 98 %   O2 Amount (l/min):     O2 Devices O2 Device: room air   Weight:       Admit Height:  Height: 62.01\" (157.5 cm)      Physical Exam:  General:  no acute distresss.  Abdomen: Fundus: appropriate, firm, non tender  Extremities: normal, atraumatic, no cyanosis, and trace edema.   Incision: clean, dry & intact    Lab results reviewed:  Yes   Rubella:  No results found for: RUBELLAIGGIN Nurse Transcribed from prenatal record --  No components found for: EXTRUBELQUAL  Rh Status:    RH type   Date Value Ref Range Status   2017 Negative  Final     Immunizations:   Immunization History   Administered Date(s) Administered   • Rho (D) Immune Globulin 2017, 2017   • Tdap 2017       Assessment/Plan     Active Problems:    Preeclampsia      Sheila Yeung is Day 3  post-partum  , Low Transverse    .      Plan:  Discharge home with standard precautions and return to clinic in 4-6 weeks.      Brittaney Sheppard MD  10/1/2017  10:24 AM  "

## 2017-10-02 ENCOUNTER — TELEPHONE (OUTPATIENT)
Dept: OTHER | Facility: HOSPITAL | Age: 27
End: 2017-10-02

## 2017-10-02 NOTE — PLAN OF CARE
Problem: Patient Care Overview (Adult)  Goal: Plan of Care Review  Outcome: Ongoing (interventions implemented as appropriate)  Had large bm today. Tolerated well. Still continues to pass gas.     10/01/17 1620   Coping/Psychosocial Response Interventions   Plan Of Care Reviewed With patient;spouse   Patient Care Overview   Progress progress toward functional goals as expected       Goal: Adult Individualization and Mutuality  Outcome: Ongoing (interventions implemented as appropriate)  Goal: Discharge Needs Assessment  Outcome: Ongoing (interventions implemented as appropriate)    Problem: Postpartum ( Delivery) (Adult)  Goal: Signs and Symptoms of Listed Potential Problems Will be Absent or Manageable (Postpartum)  Outcome: Ongoing (interventions implemented as appropriate)    Problem: Breastfeeding (Adult,NICU,,Obstetrics,Pediatric)  Goal: Signs and Symptoms of Listed Potential Problems Will be Absent or Manageable (Breastfeeding)  Outcome: Ongoing (interventions implemented as appropriate)

## 2017-10-02 NOTE — TELEPHONE ENCOUNTER
Attempted to reach pt to schedule outpatient lactation appointment. No answer. Message left regarding same. [facility] number given with request she return call.

## 2017-10-06 LAB
CYTO UR: NORMAL
LAB AP CASE REPORT: NORMAL
LAB AP CLINICAL INFORMATION: NORMAL
Lab: NORMAL
PATH REPORT.FINAL DX SPEC: NORMAL
PATH REPORT.GROSS SPEC: NORMAL

## 2017-10-10 ENCOUNTER — APPOINTMENT (OUTPATIENT)
Dept: PREADMISSION TESTING | Facility: HOSPITAL | Age: 27
End: 2017-10-10

## 2017-10-26 ENCOUNTER — APPOINTMENT (OUTPATIENT)
Dept: CARDIOLOGY | Facility: HOSPITAL | Age: 27
End: 2017-10-26
Attending: FAMILY MEDICINE

## 2017-10-26 ENCOUNTER — HOSPITAL ENCOUNTER (INPATIENT)
Facility: HOSPITAL | Age: 27
LOS: 2 days | Discharge: HOME | End: 2017-10-28
Attending: FAMILY MEDICINE | Admitting: FAMILY MEDICINE

## 2017-10-26 ENCOUNTER — ANESTHESIA EVENT (OUTPATIENT)
Dept: PERIOP | Facility: HOSPITAL | Age: 27
End: 2017-10-26

## 2017-10-26 ENCOUNTER — APPOINTMENT (OUTPATIENT)
Dept: ULTRASOUND IMAGING | Facility: HOSPITAL | Age: 27
End: 2017-10-26

## 2017-10-26 DIAGNOSIS — K80.00 CALCULUS OF GALLBLADDER WITH ACUTE CHOLECYSTITIS WITHOUT OBSTRUCTION: Primary | ICD-10-CM

## 2017-10-26 PROBLEM — R74.01 TRANSAMINITIS: Status: ACTIVE | Noted: 2017-10-26

## 2017-10-26 PROBLEM — I26.99 PE (PULMONARY THROMBOEMBOLISM) (HCC): Status: ACTIVE | Noted: 2017-10-26

## 2017-10-26 PROBLEM — I10 HYPERTENSION: Status: ACTIVE | Noted: 2017-10-26

## 2017-10-26 PROBLEM — I26.99 PE (PULMONARY THROMBOEMBOLISM) (HCC): Status: RESOLVED | Noted: 2017-10-26 | Resolved: 2017-10-26

## 2017-10-26 LAB
ALBUMIN SERPL-MCNC: 4.1 G/DL (ref 3.5–5)
ALBUMIN/GLOB SERPL: 1.3 G/DL (ref 1.1–2.5)
ALP SERPL-CCNC: 204 U/L (ref 24–120)
ALT SERPL W P-5'-P-CCNC: 185 U/L (ref 0–54)
ANION GAP SERPL CALCULATED.3IONS-SCNC: 10 MMOL/L (ref 4–13)
AST SERPL-CCNC: 361 U/L (ref 7–45)
BASOPHILS # BLD AUTO: 0.01 10*3/MM3 (ref 0–0.2)
BASOPHILS NFR BLD AUTO: 0.1 % (ref 0–2)
BH CV ECHO MEAS - AO MAX PG (FULL): 5.2 MMHG
BH CV ECHO MEAS - AO MAX PG: 9.4 MMHG
BH CV ECHO MEAS - AO MEAN PG (FULL): 3 MMHG
BH CV ECHO MEAS - AO MEAN PG: 5 MMHG
BH CV ECHO MEAS - AO ROOT AREA (BSA CORRECTED): 1.2
BH CV ECHO MEAS - AO ROOT AREA: 4 CM^2
BH CV ECHO MEAS - AO ROOT DIAM: 2.3 CM
BH CV ECHO MEAS - AO V2 MAX: 153 CM/SEC
BH CV ECHO MEAS - AO V2 MEAN: 96.9 CM/SEC
BH CV ECHO MEAS - AO V2 VTI: 30.3 CM
BH CV ECHO MEAS - AVA(I,A): 2.6 CM^2
BH CV ECHO MEAS - AVA(I,D): 2.6 CM^2
BH CV ECHO MEAS - AVA(V,A): 2.3 CM^2
BH CV ECHO MEAS - AVA(V,D): 2.3 CM^2
BH CV ECHO MEAS - BSA(HAYCOCK): 2 M^2
BH CV ECHO MEAS - BSA: 1.9 M^2
BH CV ECHO MEAS - BZI_BMI: 32.6 KILOGRAMS/M^2
BH CV ECHO MEAS - BZI_METRIC_HEIGHT: 160 CM
BH CV ECHO MEAS - BZI_METRIC_WEIGHT: 83.5 KG
BH CV ECHO MEAS - CONTRAST EF 4CH: 70.1 ML/M^2
BH CV ECHO MEAS - EDV(CUBED): 97.3 ML
BH CV ECHO MEAS - EDV(MOD-SP4): 171 ML
BH CV ECHO MEAS - EDV(TEICH): 97.3 ML
BH CV ECHO MEAS - EF(CUBED): 69.7 %
BH CV ECHO MEAS - EF(MOD-SP4): 70.1 %
BH CV ECHO MEAS - EF(TEICH): 61.4 %
BH CV ECHO MEAS - ESV(CUBED): 29.5 ML
BH CV ECHO MEAS - ESV(MOD-SP4): 51.1 ML
BH CV ECHO MEAS - ESV(TEICH): 37.6 ML
BH CV ECHO MEAS - FS: 32.8 %
BH CV ECHO MEAS - IVS/LVPW: 1
BH CV ECHO MEAS - IVSD: 1.2 CM
BH CV ECHO MEAS - LA DIMENSION: 4.3 CM
BH CV ECHO MEAS - LA/AO: 1.9
BH CV ECHO MEAS - LAT PEAK E' VEL: 11.7 CM/SEC
BH CV ECHO MEAS - LV DIASTOLIC VOL/BSA (35-75): 91.6 ML/M^2
BH CV ECHO MEAS - LV MASS(C)D: 209.9 GRAMS
BH CV ECHO MEAS - LV MASS(C)DI: 112.5 GRAMS/M^2
BH CV ECHO MEAS - LV MAX PG: 4.2 MMHG
BH CV ECHO MEAS - LV MEAN PG: 2 MMHG
BH CV ECHO MEAS - LV SYSTOLIC VOL/BSA (12-30): 27.4 ML/M^2
BH CV ECHO MEAS - LV V1 MAX: 102 CM/SEC
BH CV ECHO MEAS - LV V1 MEAN: 62.6 CM/SEC
BH CV ECHO MEAS - LV V1 VTI: 22.6 CM
BH CV ECHO MEAS - LVIDD: 4.6 CM
BH CV ECHO MEAS - LVIDS: 3.1 CM
BH CV ECHO MEAS - LVLD AP4: 9.3 CM
BH CV ECHO MEAS - LVLS AP4: 7.4 CM
BH CV ECHO MEAS - LVOT AREA (M): 3.5 CM^2
BH CV ECHO MEAS - LVOT AREA: 3.5 CM^2
BH CV ECHO MEAS - LVOT DIAM: 2.1 CM
BH CV ECHO MEAS - LVPWD: 1.2 CM
BH CV ECHO MEAS - MED PEAK E' VEL: 8.05 CM/SEC
BH CV ECHO MEAS - MR ALIAS VEL: 30.8 CM/SEC
BH CV ECHO MEAS - MR ERO: 0.03 CM^2
BH CV ECHO MEAS - MR FLOW RATE: 17.4 CM^3/SEC
BH CV ECHO MEAS - MR MAX PG: 133.6 MMHG
BH CV ECHO MEAS - MR MAX VEL: 578 CM/SEC
BH CV ECHO MEAS - MR MEAN PG: 98 MMHG
BH CV ECHO MEAS - MR MEAN VEL: 467 CM/SEC
BH CV ECHO MEAS - MR PISA RADIUS: 0.3 CM
BH CV ECHO MEAS - MR PISA: 0.57 CM^2
BH CV ECHO MEAS - MR VOLUME: 6.6 ML
BH CV ECHO MEAS - MR VTI: 218 CM
BH CV ECHO MEAS - MV A MAX VEL: 63.5 CM/SEC
BH CV ECHO MEAS - MV DEC TIME: 0.23 SEC
BH CV ECHO MEAS - MV E MAX VEL: 90.2 CM/SEC
BH CV ECHO MEAS - MV E/A: 1.4
BH CV ECHO MEAS - RAP SYSTOLE: 5 MMHG
BH CV ECHO MEAS - RVDD: 3.3 CM
BH CV ECHO MEAS - RVSP: 29.2 MMHG
BH CV ECHO MEAS - SI(AO): 64.5 ML/M^2
BH CV ECHO MEAS - SI(CUBED): 36.3 ML/M^2
BH CV ECHO MEAS - SI(LVOT): 41.9 ML/M^2
BH CV ECHO MEAS - SI(MOD-SP4): 64.2 ML/M^2
BH CV ECHO MEAS - SI(TEICH): 32 ML/M^2
BH CV ECHO MEAS - SV(AO): 120.5 ML
BH CV ECHO MEAS - SV(CUBED): 67.8 ML
BH CV ECHO MEAS - SV(LVOT): 78.3 ML
BH CV ECHO MEAS - SV(MOD-SP4): 119.9 ML
BH CV ECHO MEAS - SV(TEICH): 59.7 ML
BH CV ECHO MEAS - TR MAX VEL: 246 CM/SEC
BILIRUB SERPL-MCNC: 0.6 MG/DL (ref 0.1–1)
BUN BLD-MCNC: 11 MG/DL (ref 5–21)
BUN/CREAT SERPL: 14.3 (ref 7–25)
CALCIUM SPEC-SCNC: 9.1 MG/DL (ref 8.4–10.4)
CHLORIDE SERPL-SCNC: 107 MMOL/L (ref 98–110)
CO2 SERPL-SCNC: 26 MMOL/L (ref 24–31)
CREAT BLD-MCNC: 0.77 MG/DL (ref 0.5–1.4)
DEPRECATED RDW RBC AUTO: 40.2 FL (ref 40–54)
E/E' RATIO: 11.2
EOSINOPHIL # BLD AUTO: 0.05 10*3/MM3 (ref 0–0.7)
EOSINOPHIL NFR BLD AUTO: 0.7 % (ref 0–4)
ERYTHROCYTE [DISTWIDTH] IN BLOOD BY AUTOMATED COUNT: 13.4 % (ref 12–15)
GFR SERPL CREATININE-BSD FRML MDRD: 91 ML/MIN/1.73
GLOBULIN UR ELPH-MCNC: 3.1 GM/DL
GLUCOSE BLD-MCNC: 117 MG/DL (ref 70–100)
HAV IGM SERPL QL IA: NEGATIVE
HBV CORE IGM SERPL QL IA: NEGATIVE
HBV SURFACE AG SERPL QL IA: NEGATIVE
HCT VFR BLD AUTO: 31.9 % (ref 37–47)
HCV AB SER DONR QL: NEGATIVE
HCV S/C RATIO: 0.01 (ref 0–0.99)
HGB BLD-MCNC: 10.3 G/DL (ref 12–16)
IMM GRANULOCYTES # BLD: 0.01 10*3/MM3 (ref 0–0.03)
IMM GRANULOCYTES NFR BLD: 0.1 % (ref 0–5)
INR PPP: 1.07 (ref 0.91–1.09)
LEFT ATRIUM VOLUME INDEX: 19.6 ML/M2
LEFT ATRIUM VOLUME: 36.7 CM3
LV EF 2D ECHO EST: 70 %
LYMPHOCYTES # BLD AUTO: 0.99 10*3/MM3 (ref 0.72–4.86)
LYMPHOCYTES NFR BLD AUTO: 14.6 % (ref 15–45)
MAGNESIUM SERPL-MCNC: 2.1 MG/DL (ref 1.4–2.2)
MCH RBC QN AUTO: 26.3 PG (ref 28–32)
MCHC RBC AUTO-ENTMCNC: 32.3 G/DL (ref 33–36)
MCV RBC AUTO: 81.6 FL (ref 82–98)
MONOCYTES # BLD AUTO: 0.4 10*3/MM3 (ref 0.19–1.3)
MONOCYTES NFR BLD AUTO: 5.9 % (ref 4–12)
NEUTROPHILS # BLD AUTO: 5.33 10*3/MM3 (ref 1.87–8.4)
NEUTROPHILS NFR BLD AUTO: 78.6 % (ref 39–78)
PHOSPHATE SERPL-MCNC: 3.1 MG/DL (ref 2.5–4.5)
PLATELET # BLD AUTO: 238 10*3/MM3 (ref 130–400)
PMV BLD AUTO: 11.1 FL (ref 6–12)
POTASSIUM BLD-SCNC: 3.9 MMOL/L (ref 3.5–5.3)
PROT SERPL-MCNC: 7.2 G/DL (ref 6.3–8.7)
PROTHROMBIN TIME: 14.2 SECONDS (ref 11.9–14.6)
RBC # BLD AUTO: 3.91 10*6/MM3 (ref 4.2–5.4)
SODIUM BLD-SCNC: 143 MMOL/L (ref 135–145)
TROPONIN I SERPL-MCNC: 0.01 NG/ML (ref 0–0.03)
TROPONIN I SERPL-MCNC: 0.01 NG/ML (ref 0–0.03)
TROPONIN I SERPL-MCNC: <0.012 NG/ML (ref 0–0.03)
WBC NRBC COR # BLD: 6.79 10*3/MM3 (ref 4.8–10.8)

## 2017-10-26 PROCEDURE — 84100 ASSAY OF PHOSPHORUS: CPT | Performed by: FAMILY MEDICINE

## 2017-10-26 PROCEDURE — 93970 EXTREMITY STUDY: CPT

## 2017-10-26 PROCEDURE — 80074 ACUTE HEPATITIS PANEL: CPT | Performed by: FAMILY MEDICINE

## 2017-10-26 PROCEDURE — 76700 US EXAM ABDOM COMPLETE: CPT

## 2017-10-26 PROCEDURE — 85025 COMPLETE CBC W/AUTO DIFF WBC: CPT | Performed by: FAMILY MEDICINE

## 2017-10-26 PROCEDURE — 80053 COMPREHEN METABOLIC PANEL: CPT | Performed by: FAMILY MEDICINE

## 2017-10-26 PROCEDURE — 85610 PROTHROMBIN TIME: CPT | Performed by: FAMILY MEDICINE

## 2017-10-26 PROCEDURE — 93306 TTE W/DOPPLER COMPLETE: CPT

## 2017-10-26 PROCEDURE — 93306 TTE W/DOPPLER COMPLETE: CPT | Performed by: INTERNAL MEDICINE

## 2017-10-26 PROCEDURE — 84484 ASSAY OF TROPONIN QUANT: CPT | Performed by: FAMILY MEDICINE

## 2017-10-26 PROCEDURE — 93970 EXTREMITY STUDY: CPT | Performed by: SURGERY

## 2017-10-26 PROCEDURE — 83735 ASSAY OF MAGNESIUM: CPT | Performed by: FAMILY MEDICINE

## 2017-10-26 RX ORDER — SODIUM CHLORIDE 0.9 % (FLUSH) 0.9 %
1-10 SYRINGE (ML) INJECTION AS NEEDED
Status: DISCONTINUED | OUTPATIENT
Start: 2017-10-26 | End: 2017-10-28 | Stop reason: HOSPADM

## 2017-10-26 RX ORDER — SODIUM CHLORIDE 9 MG/ML
75 INJECTION, SOLUTION INTRAVENOUS CONTINUOUS
Status: DISCONTINUED | OUTPATIENT
Start: 2017-10-26 | End: 2017-10-28 | Stop reason: HOSPADM

## 2017-10-26 RX ORDER — FERROUS SULFATE 325(65) MG
325 TABLET ORAL
Status: DISCONTINUED | OUTPATIENT
Start: 2017-10-26 | End: 2017-10-28 | Stop reason: HOSPADM

## 2017-10-26 RX ORDER — MORPHINE SULFATE 2 MG/ML
1 INJECTION, SOLUTION INTRAMUSCULAR; INTRAVENOUS EVERY 4 HOURS PRN
Status: DISCONTINUED | OUTPATIENT
Start: 2017-10-26 | End: 2017-10-26

## 2017-10-26 RX ORDER — NALOXONE HCL 0.4 MG/ML
0.4 VIAL (ML) INJECTION
Status: DISCONTINUED | OUTPATIENT
Start: 2017-10-26 | End: 2017-10-26

## 2017-10-26 RX ADMIN — SODIUM CHLORIDE 75 ML/HR: 9 INJECTION, SOLUTION INTRAVENOUS at 05:44

## 2017-10-26 RX ADMIN — SODIUM CHLORIDE 75 ML/HR: 9 INJECTION, SOLUTION INTRAVENOUS at 23:45

## 2017-10-26 RX ADMIN — FERROUS SULFATE TAB 325 MG (65 MG ELEMENTAL FE) 325 MG: 325 (65 FE) TAB at 10:32

## 2017-10-26 RX ADMIN — METOPROLOL TARTRATE 12.5 MG: 25 TABLET, FILM COATED ORAL at 15:58

## 2017-10-26 RX ADMIN — METOPROLOL TARTRATE 12.5 MG: 25 TABLET, FILM COATED ORAL at 23:40

## 2017-10-27 ENCOUNTER — ANESTHESIA (OUTPATIENT)
Dept: PERIOP | Facility: HOSPITAL | Age: 27
End: 2017-10-27

## 2017-10-27 LAB
ALBUMIN SERPL-MCNC: 4.1 G/DL (ref 3.5–5)
ALBUMIN/GLOB SERPL: 1.2 G/DL (ref 1.1–2.5)
ALP SERPL-CCNC: 290 U/L (ref 24–120)
ALT SERPL W P-5'-P-CCNC: 690 U/L (ref 0–54)
AMYLASE SERPL-CCNC: 46 U/L (ref 30–110)
ANION GAP SERPL CALCULATED.3IONS-SCNC: 14 MMOL/L (ref 4–13)
AST SERPL-CCNC: 561 U/L (ref 7–45)
B-HCG UR QL: NEGATIVE
BILIRUB SERPL-MCNC: 0.5 MG/DL (ref 0.1–1)
BUN BLD-MCNC: 8 MG/DL (ref 5–21)
BUN/CREAT SERPL: 12.3 (ref 7–25)
CALCIUM SPEC-SCNC: 9 MG/DL (ref 8.4–10.4)
CHLORIDE SERPL-SCNC: 107 MMOL/L (ref 98–110)
CO2 SERPL-SCNC: 23 MMOL/L (ref 24–31)
CREAT BLD-MCNC: 0.65 MG/DL (ref 0.5–1.4)
DEPRECATED RDW RBC AUTO: 40.8 FL (ref 40–54)
ERYTHROCYTE [DISTWIDTH] IN BLOOD BY AUTOMATED COUNT: 13.7 % (ref 12–15)
GFR SERPL CREATININE-BSD FRML MDRD: 110 ML/MIN/1.73
GLOBULIN UR ELPH-MCNC: 3.3 GM/DL
GLUCOSE BLD-MCNC: 93 MG/DL (ref 70–100)
HCT VFR BLD AUTO: 34.8 % (ref 37–47)
HGB BLD-MCNC: 11.3 G/DL (ref 12–16)
LIPASE SERPL-CCNC: 117 U/L (ref 23–203)
MCH RBC QN AUTO: 26.7 PG (ref 28–32)
MCHC RBC AUTO-ENTMCNC: 32.5 G/DL (ref 33–36)
MCV RBC AUTO: 82.1 FL (ref 82–98)
PLATELET # BLD AUTO: 225 10*3/MM3 (ref 130–400)
PMV BLD AUTO: 11.5 FL (ref 6–12)
POTASSIUM BLD-SCNC: 4 MMOL/L (ref 3.5–5.3)
PROT SERPL-MCNC: 7.4 G/DL (ref 6.3–8.7)
RBC # BLD AUTO: 4.24 10*6/MM3 (ref 4.2–5.4)
SODIUM BLD-SCNC: 144 MMOL/L (ref 135–145)
WBC NRBC COR # BLD: 3.72 10*3/MM3 (ref 4.8–10.8)

## 2017-10-27 PROCEDURE — 88304 TISSUE EXAM BY PATHOLOGIST: CPT | Performed by: SPECIALIST

## 2017-10-27 PROCEDURE — 25010000002 ONDANSETRON PER 1 MG: Performed by: NURSE ANESTHETIST, CERTIFIED REGISTERED

## 2017-10-27 PROCEDURE — 25010000002 ONDANSETRON PER 1 MG: Performed by: ANESTHESIOLOGY

## 2017-10-27 PROCEDURE — 93010 ELECTROCARDIOGRAM REPORT: CPT | Performed by: INTERNAL MEDICINE

## 2017-10-27 PROCEDURE — 82150 ASSAY OF AMYLASE: CPT | Performed by: NURSE PRACTITIONER

## 2017-10-27 PROCEDURE — 25010000002 HYDROMORPHONE PER 4 MG: Performed by: ANESTHESIOLOGY

## 2017-10-27 PROCEDURE — 25010000003 CEFAZOLIN PER 500 MG: Performed by: SPECIALIST

## 2017-10-27 PROCEDURE — 25010000002 DEXAMETHASONE PER 1 MG: Performed by: NURSE ANESTHETIST, CERTIFIED REGISTERED

## 2017-10-27 PROCEDURE — 25010000002 MIDAZOLAM PER 1 MG: Performed by: ANESTHESIOLOGY

## 2017-10-27 PROCEDURE — 25010000002 ONDANSETRON PER 1 MG: Performed by: FAMILY MEDICINE

## 2017-10-27 PROCEDURE — 25010000002 KETOROLAC TROMETHAMINE PER 15 MG: Performed by: NURSE ANESTHETIST, CERTIFIED REGISTERED

## 2017-10-27 PROCEDURE — 25010000002 HYDROMORPHONE PER 4 MG: Performed by: NURSE ANESTHETIST, CERTIFIED REGISTERED

## 2017-10-27 PROCEDURE — 81025 URINE PREGNANCY TEST: CPT | Performed by: SPECIALIST

## 2017-10-27 PROCEDURE — 25010000002 FENTANYL CITRATE (PF) 250 MCG/5ML SOLUTION: Performed by: NURSE ANESTHETIST, CERTIFIED REGISTERED

## 2017-10-27 PROCEDURE — 83690 ASSAY OF LIPASE: CPT | Performed by: NURSE PRACTITIONER

## 2017-10-27 PROCEDURE — 80053 COMPREHEN METABOLIC PANEL: CPT | Performed by: NURSE PRACTITIONER

## 2017-10-27 PROCEDURE — 93005 ELECTROCARDIOGRAM TRACING: CPT | Performed by: FAMILY MEDICINE

## 2017-10-27 PROCEDURE — 25010000002 MORPHINE SULFATE (PF) 2 MG/ML SOLUTION: Performed by: ANESTHESIOLOGY

## 2017-10-27 PROCEDURE — 25010000002 PROPOFOL 10 MG/ML EMULSION: Performed by: NURSE ANESTHETIST, CERTIFIED REGISTERED

## 2017-10-27 PROCEDURE — 0FT44ZZ RESECTION OF GALLBLADDER, PERCUTANEOUS ENDOSCOPIC APPROACH: ICD-10-PCS | Performed by: SPECIALIST

## 2017-10-27 PROCEDURE — 85027 COMPLETE CBC AUTOMATED: CPT | Performed by: NURSE PRACTITIONER

## 2017-10-27 PROCEDURE — 25010000002 METOCLOPRAMIDE PER 10 MG: Performed by: ANESTHESIOLOGY

## 2017-10-27 PROCEDURE — 25010000002 DEXAMETHASONE PER 1 MG: Performed by: ANESTHESIOLOGY

## 2017-10-27 RX ORDER — BUPIVACAINE HYDROCHLORIDE AND EPINEPHRINE 2.5; 5 MG/ML; UG/ML
INJECTION, SOLUTION INFILTRATION; PERINEURAL AS NEEDED
Status: DISCONTINUED | OUTPATIENT
Start: 2017-10-27 | End: 2017-10-27 | Stop reason: HOSPADM

## 2017-10-27 RX ORDER — ONDANSETRON 2 MG/ML
4 INJECTION INTRAMUSCULAR; INTRAVENOUS AS NEEDED
Status: DISCONTINUED | OUTPATIENT
Start: 2017-10-27 | End: 2017-10-27 | Stop reason: HOSPADM

## 2017-10-27 RX ORDER — KETOROLAC TROMETHAMINE 30 MG/ML
INJECTION, SOLUTION INTRAMUSCULAR; INTRAVENOUS AS NEEDED
Status: DISCONTINUED | OUTPATIENT
Start: 2017-10-27 | End: 2017-10-27 | Stop reason: SURG

## 2017-10-27 RX ORDER — SODIUM CHLORIDE 9 MG/ML
INJECTION, SOLUTION INTRAVENOUS AS NEEDED
Status: DISCONTINUED | OUTPATIENT
Start: 2017-10-27 | End: 2017-10-27 | Stop reason: HOSPADM

## 2017-10-27 RX ORDER — FLUMAZENIL 0.1 MG/ML
0.2 INJECTION INTRAVENOUS AS NEEDED
Status: DISCONTINUED | OUTPATIENT
Start: 2017-10-27 | End: 2017-10-27 | Stop reason: HOSPADM

## 2017-10-27 RX ORDER — IPRATROPIUM BROMIDE AND ALBUTEROL SULFATE 2.5; .5 MG/3ML; MG/3ML
3 SOLUTION RESPIRATORY (INHALATION) ONCE AS NEEDED
Status: DISCONTINUED | OUTPATIENT
Start: 2017-10-27 | End: 2017-10-27 | Stop reason: HOSPADM

## 2017-10-27 RX ORDER — HYDROCODONE BITARTRATE AND ACETAMINOPHEN 5; 325 MG/1; MG/1
1 TABLET ORAL EVERY 4 HOURS PRN
Status: DISCONTINUED | OUTPATIENT
Start: 2017-10-27 | End: 2017-10-28 | Stop reason: HOSPADM

## 2017-10-27 RX ORDER — SODIUM CHLORIDE 0.9 % (FLUSH) 0.9 %
1-10 SYRINGE (ML) INJECTION AS NEEDED
Status: DISCONTINUED | OUTPATIENT
Start: 2017-10-27 | End: 2017-10-27 | Stop reason: HOSPADM

## 2017-10-27 RX ORDER — ROCURONIUM BROMIDE 10 MG/ML
INJECTION, SOLUTION INTRAVENOUS AS NEEDED
Status: DISCONTINUED | OUTPATIENT
Start: 2017-10-27 | End: 2017-10-27 | Stop reason: SURG

## 2017-10-27 RX ORDER — SODIUM CHLORIDE, SODIUM LACTATE, POTASSIUM CHLORIDE, CALCIUM CHLORIDE 600; 310; 30; 20 MG/100ML; MG/100ML; MG/100ML; MG/100ML
100 INJECTION, SOLUTION INTRAVENOUS CONTINUOUS
Status: DISCONTINUED | OUTPATIENT
Start: 2017-10-27 | End: 2017-10-27

## 2017-10-27 RX ORDER — DEXAMETHASONE SODIUM PHOSPHATE 4 MG/ML
INJECTION, SOLUTION INTRA-ARTICULAR; INTRALESIONAL; INTRAMUSCULAR; INTRAVENOUS; SOFT TISSUE AS NEEDED
Status: DISCONTINUED | OUTPATIENT
Start: 2017-10-27 | End: 2017-10-27 | Stop reason: SURG

## 2017-10-27 RX ORDER — MORPHINE SULFATE 2 MG/ML
2 INJECTION, SOLUTION INTRAMUSCULAR; INTRAVENOUS AS NEEDED
Status: DISCONTINUED | OUTPATIENT
Start: 2017-10-27 | End: 2017-10-27 | Stop reason: HOSPADM

## 2017-10-27 RX ORDER — HYDROMORPHONE HCL 110MG/55ML
PATIENT CONTROLLED ANALGESIA SYRINGE INTRAVENOUS AS NEEDED
Status: DISCONTINUED | OUTPATIENT
Start: 2017-10-27 | End: 2017-10-27 | Stop reason: SURG

## 2017-10-27 RX ORDER — PROPOFOL 10 MG/ML
VIAL (ML) INTRAVENOUS AS NEEDED
Status: DISCONTINUED | OUTPATIENT
Start: 2017-10-27 | End: 2017-10-27 | Stop reason: SURG

## 2017-10-27 RX ORDER — MAGNESIUM HYDROXIDE 1200 MG/15ML
LIQUID ORAL AS NEEDED
Status: DISCONTINUED | OUTPATIENT
Start: 2017-10-27 | End: 2017-10-27 | Stop reason: HOSPADM

## 2017-10-27 RX ORDER — DEXAMETHASONE SODIUM PHOSPHATE 4 MG/ML
4 INJECTION, SOLUTION INTRA-ARTICULAR; INTRALESIONAL; INTRAMUSCULAR; INTRAVENOUS; SOFT TISSUE ONCE AS NEEDED
Status: COMPLETED | OUTPATIENT
Start: 2017-10-27 | End: 2017-10-27

## 2017-10-27 RX ORDER — FENTANYL CITRATE 50 UG/ML
INJECTION, SOLUTION INTRAMUSCULAR; INTRAVENOUS AS NEEDED
Status: DISCONTINUED | OUTPATIENT
Start: 2017-10-27 | End: 2017-10-27 | Stop reason: SURG

## 2017-10-27 RX ORDER — MIDAZOLAM HYDROCHLORIDE 1 MG/ML
2 INJECTION INTRAMUSCULAR; INTRAVENOUS
Status: DISCONTINUED | OUTPATIENT
Start: 2017-10-27 | End: 2017-10-27 | Stop reason: HOSPADM

## 2017-10-27 RX ORDER — METOCLOPRAMIDE HYDROCHLORIDE 5 MG/ML
10 INJECTION INTRAMUSCULAR; INTRAVENOUS ONCE AS NEEDED
Status: COMPLETED | OUTPATIENT
Start: 2017-10-27 | End: 2017-10-27

## 2017-10-27 RX ORDER — LABETALOL HYDROCHLORIDE 5 MG/ML
5 INJECTION, SOLUTION INTRAVENOUS
Status: DISCONTINUED | OUTPATIENT
Start: 2017-10-27 | End: 2017-10-27 | Stop reason: HOSPADM

## 2017-10-27 RX ORDER — ONDANSETRON 2 MG/ML
INJECTION INTRAMUSCULAR; INTRAVENOUS AS NEEDED
Status: DISCONTINUED | OUTPATIENT
Start: 2017-10-27 | End: 2017-10-27 | Stop reason: SURG

## 2017-10-27 RX ORDER — HYDRALAZINE HYDROCHLORIDE 20 MG/ML
5 INJECTION INTRAMUSCULAR; INTRAVENOUS
Status: DISCONTINUED | OUTPATIENT
Start: 2017-10-27 | End: 2017-10-27 | Stop reason: HOSPADM

## 2017-10-27 RX ORDER — NALOXONE HCL 0.4 MG/ML
0.04 VIAL (ML) INJECTION AS NEEDED
Status: DISCONTINUED | OUTPATIENT
Start: 2017-10-27 | End: 2017-10-27 | Stop reason: HOSPADM

## 2017-10-27 RX ORDER — ONDANSETRON 2 MG/ML
4 INJECTION INTRAMUSCULAR; INTRAVENOUS EVERY 6 HOURS PRN
Status: DISCONTINUED | OUTPATIENT
Start: 2017-10-27 | End: 2017-10-28 | Stop reason: HOSPADM

## 2017-10-27 RX ORDER — METOCLOPRAMIDE HYDROCHLORIDE 5 MG/ML
5 INJECTION INTRAMUSCULAR; INTRAVENOUS
Status: DISCONTINUED | OUTPATIENT
Start: 2017-10-27 | End: 2017-10-27 | Stop reason: HOSPADM

## 2017-10-27 RX ORDER — MIDAZOLAM HYDROCHLORIDE 1 MG/ML
1 INJECTION INTRAMUSCULAR; INTRAVENOUS
Status: DISCONTINUED | OUTPATIENT
Start: 2017-10-27 | End: 2017-10-27 | Stop reason: HOSPADM

## 2017-10-27 RX ORDER — LIDOCAINE HYDROCHLORIDE 20 MG/ML
INJECTION, SOLUTION INFILTRATION; PERINEURAL AS NEEDED
Status: DISCONTINUED | OUTPATIENT
Start: 2017-10-27 | End: 2017-10-27 | Stop reason: SURG

## 2017-10-27 RX ADMIN — HYDROCODONE BITARTRATE AND ACETAMINOPHEN 1 TABLET: 5; 325 TABLET ORAL at 20:56

## 2017-10-27 RX ADMIN — SUGAMMADEX 200 MG: 100 INJECTION, SOLUTION INTRAVENOUS at 08:26

## 2017-10-27 RX ADMIN — METOCLOPRAMIDE 10 MG: 5 INJECTION, SOLUTION INTRAMUSCULAR; INTRAVENOUS at 07:04

## 2017-10-27 RX ADMIN — CEFAZOLIN SODIUM 1 G: 1 SOLUTION INTRAVENOUS at 08:03

## 2017-10-27 RX ADMIN — ONDANSETRON HYDROCHLORIDE 4 MG: 2 SOLUTION INTRAMUSCULAR; INTRAVENOUS at 08:25

## 2017-10-27 RX ADMIN — METOPROLOL TARTRATE 12.5 MG: 25 TABLET, FILM COATED ORAL at 20:56

## 2017-10-27 RX ADMIN — HYDROCODONE BITARTRATE AND ACETAMINOPHEN 1 TABLET: 5; 325 TABLET ORAL at 17:04

## 2017-10-27 RX ADMIN — HYDROCODONE BITARTRATE AND ACETAMINOPHEN 1 TABLET: 5; 325 TABLET ORAL at 11:44

## 2017-10-27 RX ADMIN — MORPHINE SULFATE 2 MG: 2 INJECTION, SOLUTION INTRAMUSCULAR; INTRAVENOUS at 09:20

## 2017-10-27 RX ADMIN — HYDROMORPHONE HYDROCHLORIDE 1 MG: 2 INJECTION, SOLUTION INTRAMUSCULAR; INTRAVENOUS; SUBCUTANEOUS at 08:32

## 2017-10-27 RX ADMIN — HYDROMORPHONE HYDROCHLORIDE 1 MG: 2 INJECTION, SOLUTION INTRAMUSCULAR; INTRAVENOUS; SUBCUTANEOUS at 08:31

## 2017-10-27 RX ADMIN — HYDROMORPHONE HYDROCHLORIDE 1 MG: 1 INJECTION, SOLUTION INTRAMUSCULAR; INTRAVENOUS; SUBCUTANEOUS at 08:50

## 2017-10-27 RX ADMIN — MORPHINE SULFATE 2 MG: 2 INJECTION, SOLUTION INTRAMUSCULAR; INTRAVENOUS at 09:25

## 2017-10-27 RX ADMIN — LIDOCAINE HYDROCHLORIDE 0.5 ML: 10 INJECTION, SOLUTION EPIDURAL; INFILTRATION; INTRACAUDAL; PERINEURAL at 07:04

## 2017-10-27 RX ADMIN — ROCURONIUM BROMIDE 25 MG: 10 INJECTION INTRAVENOUS at 07:51

## 2017-10-27 RX ADMIN — PROPOFOL 100 MG: 10 INJECTION, EMULSION INTRAVENOUS at 07:51

## 2017-10-27 RX ADMIN — ONDANSETRON 4 MG: 2 INJECTION INTRAMUSCULAR; INTRAVENOUS at 08:52

## 2017-10-27 RX ADMIN — HYDROMORPHONE HYDROCHLORIDE 1 MG: 1 INJECTION, SOLUTION INTRAMUSCULAR; INTRAVENOUS; SUBCUTANEOUS at 09:00

## 2017-10-27 RX ADMIN — SODIUM CHLORIDE 75 ML/HR: 9 INJECTION, SOLUTION INTRAVENOUS at 21:15

## 2017-10-27 RX ADMIN — METOPROLOL TARTRATE 12.5 MG: 25 TABLET, FILM COATED ORAL at 10:31

## 2017-10-27 RX ADMIN — DEXAMETHASONE SODIUM PHOSPHATE 4 MG: 4 INJECTION, SOLUTION INTRAMUSCULAR; INTRAVENOUS at 07:04

## 2017-10-27 RX ADMIN — MIDAZOLAM HYDROCHLORIDE 2 MG: 1 INJECTION, SOLUTION INTRAMUSCULAR; INTRAVENOUS at 07:04

## 2017-10-27 RX ADMIN — DEXAMETHASONE SODIUM PHOSPHATE 8 MG: 4 INJECTION, SOLUTION INTRAMUSCULAR; INTRAVENOUS at 08:01

## 2017-10-27 RX ADMIN — ONDANSETRON 4 MG: 2 INJECTION INTRAMUSCULAR; INTRAVENOUS at 17:55

## 2017-10-27 RX ADMIN — MORPHINE SULFATE 2 MG: 2 INJECTION, SOLUTION INTRAMUSCULAR; INTRAVENOUS at 09:15

## 2017-10-27 RX ADMIN — SODIUM CHLORIDE, POTASSIUM CHLORIDE, SODIUM LACTATE AND CALCIUM CHLORIDE 100 ML/HR: 600; 310; 30; 20 INJECTION, SOLUTION INTRAVENOUS at 07:04

## 2017-10-27 RX ADMIN — FENTANYL CITRATE 100 MCG: 50 INJECTION INTRAMUSCULAR; INTRAVENOUS at 08:00

## 2017-10-27 RX ADMIN — KETOROLAC TROMETHAMINE 60 MG: 30 INJECTION, SOLUTION INTRAMUSCULAR at 08:26

## 2017-10-27 RX ADMIN — FENTANYL CITRATE 150 MCG: 50 INJECTION INTRAMUSCULAR; INTRAVENOUS at 07:51

## 2017-10-27 RX ADMIN — LIDOCAINE HYDROCHLORIDE 100 MG: 20 INJECTION, SOLUTION INFILTRATION; PERINEURAL at 07:51

## 2017-10-27 NOTE — ANESTHESIA POSTPROCEDURE EVALUATION
"Patient: Sheila Yeung    Procedure Summary     Date Anesthesia Start Anesthesia Stop Room / Location    10/27/17 0747 0906  [facility] OR 06 / [facility] OR       Procedure Diagnosis Surgeon Provider    CHOLECYSTECTOMY LAPAROSCOPIC (N/A Abdomen) Calculus of gallbladder with acute cholecystitis without obstruction  (Calculus of gallbladder with acute cholecystitis without obstruction [K80.00]) MD Vickey Schmid CRNA          Anesthesia Type: general  Last vitals  BP   136/91 (10/27/17 1009)   Temp   96.7 °F (35.9 °C) (10/27/17 1009)   Pulse   90 (10/27/17 1009)   Resp   20 (10/27/17 1009)     SpO2   93 % (10/27/17 1009)     Post Anesthesia Care and Evaluation      Comments: Patient discharged from PACU prior to anesthesia evaluation based on Vadim Score.  For details, see RN note.     /91 (BP Location: Left arm, Patient Position: Lying)  Pulse 90  Temp 96.7 °F (35.9 °C) (Tympanic)   Resp 20  Ht 63\" (160 cm)  Wt 183 lb 4.8 oz (83.1 kg)  SpO2 93%  BMI 32.47 kg/m2      "

## 2017-10-27 NOTE — ANESTHESIA PROCEDURE NOTES
Airway  Urgency: elective    Airway not difficult    General Information and Staff    Patient location during procedure: OR  CRNA: BRITTNI JONES    Indications and Patient Condition  Indications for airway management: airway protection    Preoxygenated: yes  Mask difficulty assessment: 1 - vent by mask    Final Airway Details  Final airway type: endotracheal airway      Successful airway: ETT  Cuffed: yes   Successful intubation technique: direct laryngoscopy  Facilitating devices/methods: intubating stylet and Bougie  Endotracheal tube insertion site: oral  Blade: Guo  Blade size: #2  ETT size: 7.5 mm  Cormack-Lehane Classification: grade IIa - partial view of glottis  Placement verified by: chest auscultation and capnometry   Cuff volume (mL): 8  Measured from: lips  ETT to lips (cm): 21  Number of attempts at approach: 2    Additional Comments  Atraumatic intubation. Cords closed on first attempt. Additional time PPV, and second attempt with bougie easily placed through voc. Cords with ETT advanced without resistance.

## 2017-10-27 NOTE — ANESTHESIA PREPROCEDURE EVALUATION
Anesthesia Evaluation     Patient summary reviewed   no history of anesthetic complications:  NPO Solid Status: > 8 hours  NPO Liquid Status: > 8 hours     Airway   Mallampati: II  TM distance: >3 FB  Neck ROM: full  no difficulty expected  Dental - normal exam     Pulmonary - normal exam   (-) asthma, sleep apnea, not a smoker  Cardiovascular - normal exam  Exercise tolerance: good (4-7 METS)    Patient on routine beta blocker and Beta blocker given within 24 hours of surgery    (+) hypertension (pre-eclampsia during pregnancy, 4 weeks post partum, now on metoprolol),     ROS comment: Echo:  ·Left ventricular systolic function is normal. Estimated EF = 70%.  ·Left ventricular wall thickness is consistent with borderline concentric hypertrophy.  ·Mild mitral valve regurgitation is present  ·No echocardiographic evidence of pulmonary embolism    Neuro/Psych  (-) seizures, TIA, CVA  GI/Hepatic/Renal/Endo    (+) obesity,    (-) liver disease, no renal disease, diabetes    Musculoskeletal     Abdominal    Substance History      OB/GYN      Comment: 4 weeks post partum      Other                                      Anesthesia Plan    ASA 2     general     intravenous induction   Anesthetic plan and risks discussed with patient.

## 2017-10-28 ENCOUNTER — TELEPHONE (OUTPATIENT)
Dept: GASTROENTEROLOGY | Facility: CLINIC | Age: 27
End: 2017-10-28

## 2017-10-28 VITALS
TEMPERATURE: 97.8 F | HEART RATE: 80 BPM | HEIGHT: 63 IN | OXYGEN SATURATION: 98 % | WEIGHT: 183.3 LBS | SYSTOLIC BLOOD PRESSURE: 130 MMHG | RESPIRATION RATE: 16 BRPM | DIASTOLIC BLOOD PRESSURE: 65 MMHG | BODY MASS INDEX: 32.48 KG/M2

## 2017-10-28 DIAGNOSIS — R79.89 ABNORMAL LIVER FUNCTION TESTS: Primary | ICD-10-CM

## 2017-10-28 LAB
ALBUMIN SERPL-MCNC: 3.5 G/DL (ref 3.5–5)
ALBUMIN/GLOB SERPL: 1.3 G/DL (ref 1.1–2.5)
ALP SERPL-CCNC: 236 U/L (ref 24–120)
ALT SERPL W P-5'-P-CCNC: 390 U/L (ref 0–54)
AMYLASE SERPL-CCNC: 72 U/L (ref 30–110)
ANION GAP SERPL CALCULATED.3IONS-SCNC: 9 MMOL/L (ref 4–13)
AST SERPL-CCNC: 141 U/L (ref 7–45)
BILIRUB CONJ SERPL-MCNC: 0 MG/DL (ref 0–0.3)
BILIRUB SERPL-MCNC: 0.4 MG/DL (ref 0.1–1)
BUN BLD-MCNC: 10 MG/DL (ref 5–21)
BUN/CREAT SERPL: 14.7 (ref 7–25)
CALCIUM SPEC-SCNC: 8.7 MG/DL (ref 8.4–10.4)
CHLORIDE SERPL-SCNC: 107 MMOL/L (ref 98–110)
CO2 SERPL-SCNC: 23 MMOL/L (ref 24–31)
CREAT BLD-MCNC: 0.68 MG/DL (ref 0.5–1.4)
DEPRECATED RDW RBC AUTO: 41 FL (ref 40–54)
ERYTHROCYTE [DISTWIDTH] IN BLOOD BY AUTOMATED COUNT: 13.5 % (ref 12–15)
GFR SERPL CREATININE-BSD FRML MDRD: 105 ML/MIN/1.73
GLOBULIN UR ELPH-MCNC: 2.8 GM/DL
GLUCOSE BLD-MCNC: 90 MG/DL (ref 70–100)
HCT VFR BLD AUTO: 30.9 % (ref 37–47)
HGB BLD-MCNC: 9.6 G/DL (ref 12–16)
LIPASE SERPL-CCNC: 272 U/L (ref 23–203)
MCH RBC QN AUTO: 25.9 PG (ref 28–32)
MCHC RBC AUTO-ENTMCNC: 31.1 G/DL (ref 33–36)
MCV RBC AUTO: 83.3 FL (ref 82–98)
PLATELET # BLD AUTO: 238 10*3/MM3 (ref 130–400)
PMV BLD AUTO: 11.7 FL (ref 6–12)
POTASSIUM BLD-SCNC: 3.7 MMOL/L (ref 3.5–5.3)
PROT SERPL-MCNC: 6.3 G/DL (ref 6.3–8.7)
RBC # BLD AUTO: 3.71 10*6/MM3 (ref 4.2–5.4)
SODIUM BLD-SCNC: 139 MMOL/L (ref 135–145)
WBC NRBC COR # BLD: 5.25 10*3/MM3 (ref 4.8–10.8)

## 2017-10-28 PROCEDURE — 82248 BILIRUBIN DIRECT: CPT | Performed by: INTERNAL MEDICINE

## 2017-10-28 PROCEDURE — 80053 COMPREHEN METABOLIC PANEL: CPT | Performed by: NURSE PRACTITIONER

## 2017-10-28 PROCEDURE — 99221 1ST HOSP IP/OBS SF/LOW 40: CPT | Performed by: INTERNAL MEDICINE

## 2017-10-28 PROCEDURE — 85027 COMPLETE CBC AUTOMATED: CPT | Performed by: NURSE PRACTITIONER

## 2017-10-28 PROCEDURE — 83690 ASSAY OF LIPASE: CPT | Performed by: NURSE PRACTITIONER

## 2017-10-28 PROCEDURE — 82150 ASSAY OF AMYLASE: CPT | Performed by: NURSE PRACTITIONER

## 2017-10-28 PROCEDURE — 25010000002 ONDANSETRON PER 1 MG: Performed by: FAMILY MEDICINE

## 2017-10-28 RX ORDER — HYDROCODONE BITARTRATE AND ACETAMINOPHEN 5; 325 MG/1; MG/1
1 TABLET ORAL EVERY 4 HOURS PRN
Qty: 18 TABLET | Refills: 0 | Status: SHIPPED | OUTPATIENT
Start: 2017-10-28 | End: 2017-11-06

## 2017-10-28 RX ADMIN — HYDROCODONE BITARTRATE AND ACETAMINOPHEN 1 TABLET: 5; 325 TABLET ORAL at 02:19

## 2017-10-28 RX ADMIN — HYDROCODONE BITARTRATE AND ACETAMINOPHEN 1 TABLET: 5; 325 TABLET ORAL at 08:43

## 2017-10-28 RX ADMIN — SODIUM CHLORIDE 75 ML/HR: 9 INJECTION, SOLUTION INTRAVENOUS at 11:36

## 2017-10-28 RX ADMIN — HYDROCODONE BITARTRATE AND ACETAMINOPHEN 1 TABLET: 5; 325 TABLET ORAL at 12:56

## 2017-10-28 RX ADMIN — ONDANSETRON 4 MG: 2 INJECTION INTRAMUSCULAR; INTRAVENOUS at 02:19

## 2017-10-28 RX ADMIN — METOPROLOL TARTRATE 12.5 MG: 25 TABLET, FILM COATED ORAL at 08:39

## 2017-10-28 RX ADMIN — FERROUS SULFATE TAB 325 MG (65 MG ELEMENTAL FE) 325 MG: 325 (65 FE) TAB at 08:39

## 2017-10-28 NOTE — TELEPHONE ENCOUNTER
Please contact patient on Monday so that she can have liver function tests done locally.  Results will need to be sent to me.  I have placed the order as external.    Mary Lou Wei MD

## 2017-10-30 LAB
CYTO UR: NORMAL
LAB AP CASE REPORT: NORMAL
Lab: NORMAL
PATH REPORT.FINAL DX SPEC: NORMAL
PATH REPORT.GROSS SPEC: NORMAL

## 2017-10-30 NOTE — TELEPHONE ENCOUNTER
I have left her a voicemail letting her know that I am mailing this lab order for LFT's so she can have it done locally.

## 2017-11-13 DIAGNOSIS — R79.89 ABNORMAL LIVER FUNCTION TESTS: ICD-10-CM

## 2024-12-13 ENCOUNTER — INITIAL PRENATAL (OUTPATIENT)
Age: 34
End: 2024-12-13
Payer: COMMERCIAL

## 2024-12-13 VITALS — DIASTOLIC BLOOD PRESSURE: 64 MMHG | BODY MASS INDEX: 30.29 KG/M2 | SYSTOLIC BLOOD PRESSURE: 110 MMHG | WEIGHT: 171 LBS

## 2024-12-13 DIAGNOSIS — O21.0 MORNING SICKNESS: ICD-10-CM

## 2024-12-13 DIAGNOSIS — Z34.81 MULTIGRAVIDA IN FIRST TRIMESTER: ICD-10-CM

## 2024-12-13 DIAGNOSIS — Z87.59 HISTORY OF ECLAMPSIA: ICD-10-CM

## 2024-12-13 DIAGNOSIS — Z3A.08 8 WEEKS GESTATION OF PREGNANCY: Primary | ICD-10-CM

## 2024-12-13 RX ORDER — DOXYLAMINE SUCCINATE AND PYRIDOXINE HYDROCHLORIDE 20; 20 MG/1; MG/1
1 TABLET, EXTENDED RELEASE ORAL EVERY 12 HOURS SCHEDULED
Qty: 60 TABLET | Refills: 8 | Status: SHIPPED | OUTPATIENT
Start: 2024-12-13

## 2024-12-13 RX ORDER — ONDANSETRON 4 MG/1
4 TABLET, ORALLY DISINTEGRATING ORAL EVERY 6 HOURS PRN
Qty: 90 TABLET | Refills: 8 | Status: SHIPPED | OUTPATIENT
Start: 2024-12-13

## 2024-12-13 NOTE — PROGRESS NOTES
34-year old patient arrived to initiate prenatal care.     HPI: . No LMP recorded. Patient is pregnant.  Pregnancy was a big surprise. Pre-pregnancy weight of 171.    Previous prenatal history significant for: eclampsia w/emergency ,  x 2, hyperemesis gravidarum x2  History significant for: depression, HSV 1, gallstones, abnormal pap     The following portion of the patient's history were reviewed and updated as needed: allergies, current medications, past family history, past medical history, social history, surgical history, and problem list.    ROS: All systems reviewed and are negative with exception of the following: nausea and vomiting, amenorrhea      US ordered today, reviewed and shows IUP of 8w0d gestation and Estimated Date of Delivery: 25    Pap Smear : wnl (per patient)    Exam:  Wt: 171 lb for TWG of 0 kg (0 lb), B/P 110/64, FHTs 179   General Appearance:  healthy-appearing . Appropriate mood and behavior.  HEENT:  Neck supple, no thyroidmegaly.  Cardiorespiratory: HR str and reg. No murmur. Lungs clear. Resp even and unlabored.  Abd: Soft, nontender. No CVA tenderness.   Ext: Calves non-tender. No cyanosis or edema.    Diagnoses and all orders for this visit:    1. 8 weeks gestation of pregnancy (Primary)  -     ABO / Rh  -     Ambulatory Referral to MFM/Perinatology  -     Antibody Screen  -     CBC & Differential  -     Chlamydia trachomatis, Neisseria gonorrhoeae, PCR w/ confirmation - Urine, Urine, Clean Catch  -     ToxASSURE Select 13 (MW) - Urine, Clean Catch  -     HCV Antibody Rfx To Qnt PCR  -     Hepatitis B Surface Antigen  -     HIV-1 / O / 2 Ag / Antibody  -     RPR, Rfx Qn RPR / Confirm TP  -     Rubella Antibody, IgG  -     Urine Culture - , Urine, Clean Catch  -     Varicella Zoster Antibody, IgG    2. Multigravida in first trimester  Reviewed information in new OB packet, including OTC medications for use during pregnancy, first trimester  of pregnancy and discomforts, regular OB routine, ffDNA/chromosomal risk and maternal carrier screening testing.  Advised to maintain regular activity and/or exercise. Discussed bleeding and pelvic pain warnings and other signs to report. Discussed and ordered initial prenatal labs today. First Trimester of Pregnancy video included in AVS.    3. History of eclampsia  To start ASA 81 mg at 12 weeks    4. Morning sickness  Discussed nausea relief measures both nonpharmacologic (small frequent meals or snacks, avoiding triggers, aromatherapy, ginger, hard and/or sour candies or Preggie Pops, ginger, accu-pressure wrist bands, citrus/citric acid) and pharmacologic (both OTC and Rx). Encouraged adequate hydration and intake of calories. Patient to notify provider if symptoms persist or worsen Morning Sickness and Doxylamine; Vitamin B6 Delayed- and Extended-Release Tablets education included in the AVS.  -     doxylamine-pyridoxine ER (Bonjesta) 20-20 MG tablet controlled-release tablet; Take 1 tablet by mouth Every 12 (Twelve) Hours.  Dispense: 60 tablet; Refill: 8  -     ondansetron ODT (ZOFRAN-ODT) 4 MG disintegrating tablet; Place 1 tablet on the tongue Every 6 (Six) Hours As Needed for Nausea or Vomiting.  Dispense: 90 tablet; Refill: 8            Return to the office in 4 weeks for routine follow up and as needed with concerns.    This note has been signed electronically.   Kristie ALLAN

## 2024-12-15 PROBLEM — K80.00 CALCULUS OF GALLBLADDER WITH ACUTE CHOLECYSTITIS WITHOUT OBSTRUCTION: Status: RESOLVED | Noted: 2017-10-26 | Resolved: 2024-12-15

## 2024-12-15 PROBLEM — I10 HYPERTENSION: Status: RESOLVED | Noted: 2017-10-26 | Resolved: 2024-12-15

## 2024-12-15 PROBLEM — R87.619 ABNORMAL CERVICAL PAPANICOLAOU SMEAR: Status: RESOLVED | Noted: 2021-08-26 | Resolved: 2024-12-15

## 2024-12-15 PROBLEM — R74.01 TRANSAMINITIS: Status: RESOLVED | Noted: 2017-10-26 | Resolved: 2024-12-15

## 2024-12-23 DIAGNOSIS — O09.899 RUBELLA NON-IMMUNE STATUS, ANTEPARTUM: Primary | ICD-10-CM

## 2024-12-23 DIAGNOSIS — Z28.39 RUBELLA NON-IMMUNE STATUS, ANTEPARTUM: Primary | ICD-10-CM

## 2024-12-23 DIAGNOSIS — Z34.81 MULTIGRAVIDA IN FIRST TRIMESTER: ICD-10-CM

## 2024-12-23 LAB
ABO GROUP BLD: NORMAL
BACTERIA UR CULT: NO GROWTH
BACTERIA UR CULT: NORMAL
BASOPHILS # BLD AUTO: 0 X10E3/UL (ref 0–0.2)
BASOPHILS NFR BLD AUTO: 0 %
BLD GP AB SCN SERPL QL: NEGATIVE
C TRACH RRNA SPEC QL NAA+PROBE: NEGATIVE
DRUGS UR: NORMAL
EOSINOPHIL # BLD AUTO: 0.1 X10E3/UL (ref 0–0.4)
EOSINOPHIL NFR BLD AUTO: 1 %
ERYTHROCYTE [DISTWIDTH] IN BLOOD BY AUTOMATED COUNT: 11.7 % (ref 11.7–15.4)
HBV SURFACE AG SERPL QL IA: NEGATIVE
HCT VFR BLD AUTO: 38.1 % (ref 34–46.6)
HCV AB SERPL QL IA: NORMAL
HCV IGG SERPL QL IA: NON REACTIVE
HGB BLD-MCNC: 13 G/DL (ref 11.1–15.9)
HIV 1+2 AB+HIV1 P24 AG SERPL QL IA: NON REACTIVE
IMM GRANULOCYTES # BLD AUTO: 0 X10E3/UL (ref 0–0.1)
IMM GRANULOCYTES NFR BLD AUTO: 0 %
LYMPHOCYTES # BLD AUTO: 1.7 X10E3/UL (ref 0.7–3.1)
LYMPHOCYTES NFR BLD AUTO: 25 %
MCH RBC QN AUTO: 31.3 PG (ref 26.6–33)
MCHC RBC AUTO-ENTMCNC: 34.1 G/DL (ref 31.5–35.7)
MCV RBC AUTO: 92 FL (ref 79–97)
MONOCYTES # BLD AUTO: 0.4 X10E3/UL (ref 0.1–0.9)
MONOCYTES NFR BLD AUTO: 6 %
N GONORRHOEA RRNA SPEC QL NAA+PROBE: NEGATIVE
NEUTROPHILS # BLD AUTO: 4.6 X10E3/UL (ref 1.4–7)
NEUTROPHILS NFR BLD AUTO: 68 %
PLATELET # BLD AUTO: 261 X10E3/UL (ref 150–450)
RBC # BLD AUTO: 4.15 X10E6/UL (ref 3.77–5.28)
RH BLD: NEGATIVE
RPR SER QL: NON REACTIVE
RUBV IGG SERPL IA-ACNC: 0.92 INDEX
VZV IGG SER QL IA: REACTIVE
WBC # BLD AUTO: 6.8 X10E3/UL (ref 3.4–10.8)

## 2024-12-26 ENCOUNTER — REFERRAL TRIAGE (OUTPATIENT)
Dept: LABOR AND DELIVERY | Facility: HOSPITAL | Age: 34
End: 2024-12-26
Payer: COMMERCIAL

## 2025-01-15 ENCOUNTER — ROUTINE PRENATAL (OUTPATIENT)
Age: 35
End: 2025-01-15
Payer: COMMERCIAL

## 2025-01-15 VITALS — SYSTOLIC BLOOD PRESSURE: 118 MMHG | WEIGHT: 170.4 LBS | BODY MASS INDEX: 30.19 KG/M2 | DIASTOLIC BLOOD PRESSURE: 72 MMHG

## 2025-01-15 DIAGNOSIS — Z3A.12 12 WEEKS GESTATION OF PREGNANCY: ICD-10-CM

## 2025-01-15 DIAGNOSIS — O34.211 MATERNAL CARE DUE TO LOW TRANSVERSE UTERINE SCAR FROM PREVIOUS CESAREAN DELIVERY: ICD-10-CM

## 2025-01-15 DIAGNOSIS — Z87.59 HISTORY OF ECLAMPSIA: Primary | ICD-10-CM

## 2025-01-15 RX ORDER — SERTRALINE HYDROCHLORIDE 100 MG/1
TABLET, FILM COATED ORAL
COMMUNITY

## 2025-01-15 RX ORDER — PRENATAL VIT NO.126/IRON/FOLIC 28MG-0.8MG
TABLET ORAL DAILY
COMMUNITY

## 2025-01-15 NOTE — PROGRESS NOTES
No complaints except round ligament pain. Keep down po. Denies VB, LOF, ctx.  Defers genetic testing/carrier screening.  Has appt for anatomy US with Dr. Ying. Taking daily ASA    /72   Wt 77.3 kg (170 lb 6.4 oz)   BMI 30.19 kg/m²    FHTs 166  Urine protein trace, urine glucose negative    Diagnoses and all orders for this visit:    1. History of eclampsia (Primary)  Hx eclampsia with G2 per pt, however I only see dx of severe preeclampsia.  Baseline PCR today.  Will continue to look through records from G2 delivery.  Pt with severe preeclampsia requiring delivery. Pt was readmitted at 4 weeks postpartum with possible PE, however work up at [facility] ruled out PE. Pt did have acute cholecystitis and was taken to the OR for LSC madhavi.  Pt did well and was sent home.      Declines NIPS/carrier screening. RTC 4 weeks with sneak peak.   2. Maternal care due to low transverse uterine scar from previous  delivery  Previous c/s x 2 for repeat  3. 12 weeks gestation of pregnancy

## 2025-01-29 ENCOUNTER — PATIENT OUTREACH (OUTPATIENT)
Dept: LABOR AND DELIVERY | Facility: HOSPITAL | Age: 35
End: 2025-01-29
Payer: COMMERCIAL

## 2025-01-29 NOTE — OUTREACH NOTE
[facility]  Unable to Reach    Questions/Answers      Flowsheet Row Responses   Pending Outreach Confirm Patient Interest   Call Attempt First   Outcome No answer/busy, Left message, avoxhart message sent to patient                Ximena Olivas RN  Maternity Nurse Navigator    1/29/2025, 13:47 CST

## 2025-02-03 ENCOUNTER — PATIENT OUTREACH (OUTPATIENT)
Dept: LABOR AND DELIVERY | Facility: HOSPITAL | Age: 35
End: 2025-02-03
Payer: COMMERCIAL

## 2025-02-03 NOTE — OUTREACH NOTE
[facility]  Enrollment    Current Estimated Gestational Age: 15w3d    Questions/Answers      Flowsheet Row Responses   Would like to participate? Yes   Date of Intake Visit 02/03/25            [facility]  Intake    Current Estimated Gestational Age: 15w3d    Intake Assessment      Flowsheet Row Responses   Best Method for Contacting Cell   Able to keep appointments as scheduled Yes   Resources Presently Utilizing: WIC (Women, Infant, Children)   Maternal Warning Signs Provided   Other: Provided   Other Education HANDS, Housing Assistance, How to find a dentist, How to find a pediatrician, Mental Health Services, Meds to Beds, Insurance benefits/Incentives, How to find a primary care provider, Smoking/Vaping Cessation, SNAP Benefits, Substance Use Disorder Treatment, Transportation Assistance, WIC Benefits            Learning Assessment      Flowsheet Row Responses   Relationship Patient   Does the learner have any barriers to learning? No Barriers   What is the preferred language of the learner for medical teaching? English   How does the learner prefer to learn new concepts? Listening, Reading, Demonstration, Pictures/Video                Referral submitted to the following resources (verbal consent received to submit demographic information):     HANDS    Resource letter sent to [facility].     Ximena Olivas RN  Maternity Nurse Navigator    2/3/2025, 13:34 CST          Ximena Olivas RN  Maternity Nurse Navigator    2/3/2025, 13:34 CST

## 2025-02-07 ENCOUNTER — ROUTINE PRENATAL (OUTPATIENT)
Dept: OBSTETRICS AND GYNECOLOGY | Age: 35
End: 2025-02-07
Payer: COMMERCIAL

## 2025-02-07 VITALS — SYSTOLIC BLOOD PRESSURE: 122 MMHG | WEIGHT: 171 LBS | BODY MASS INDEX: 30.29 KG/M2 | DIASTOLIC BLOOD PRESSURE: 84 MMHG

## 2025-02-07 DIAGNOSIS — Z87.59 HISTORY OF ECLAMPSIA: ICD-10-CM

## 2025-02-07 DIAGNOSIS — Z34.82 PRENATAL CARE, SUBSEQUENT PREGNANCY, SECOND TRIMESTER: ICD-10-CM

## 2025-02-07 DIAGNOSIS — O21.0 MORNING SICKNESS: ICD-10-CM

## 2025-02-07 DIAGNOSIS — Z3A.16 16 WEEKS GESTATION OF PREGNANCY: Primary | ICD-10-CM

## 2025-02-07 LAB
GLUCOSE UR STRIP-MCNC: NEGATIVE MG/DL
PROT UR STRIP-MCNC: NEGATIVE MG/DL

## 2025-02-07 NOTE — PROGRESS NOTES
"Patient says she occasionally sees \"fairy sparkles\" but has no other complaints.  Still having N/V and takes zofran every morning, but she has not lost any weight.  Patient with strongly encouraged to add 81 mg baby ASA since she had severe preeclampsia with previous pregnancy.  No cramping or VB  Patient declines ffDNA  MFM anatomy scan already scheduled    Diagnoses and all orders for this visit:    1. 16 weeks gestation of pregnancy (Primary)  -     POC Urinalysis Dipstick    2. History of eclampsia    3. Prenatal care, subsequent pregnancy, second trimester    4. Morning sickness       "

## 2025-02-26 ENCOUNTER — TELEPHONE (OUTPATIENT)
Dept: OBSTETRICS AND GYNECOLOGY | Age: 35
End: 2025-02-26
Payer: COMMERCIAL

## 2025-02-26 NOTE — TELEPHONE ENCOUNTER
Called pt to get all future appt scheduled. Pt stated she did not have time right now but would call back tomorrow or make them at her next appt next week 3/6

## 2025-03-06 ENCOUNTER — ROUTINE PRENATAL (OUTPATIENT)
Dept: OBSTETRICS AND GYNECOLOGY | Age: 35
End: 2025-03-06
Payer: COMMERCIAL

## 2025-03-06 VITALS — DIASTOLIC BLOOD PRESSURE: 82 MMHG | WEIGHT: 179 LBS | SYSTOLIC BLOOD PRESSURE: 128 MMHG | BODY MASS INDEX: 31.71 KG/M2

## 2025-03-06 DIAGNOSIS — Z87.59 HISTORY OF ECLAMPSIA: ICD-10-CM

## 2025-03-06 DIAGNOSIS — Z34.82 PRENATAL CARE, SUBSEQUENT PREGNANCY, SECOND TRIMESTER: ICD-10-CM

## 2025-03-06 DIAGNOSIS — Z3A.19 19 WEEKS GESTATION OF PREGNANCY: Primary | ICD-10-CM

## 2025-03-06 LAB
GLUCOSE UR STRIP-MCNC: NEGATIVE MG/DL
PROT UR STRIP-MCNC: NEGATIVE MG/DL

## 2025-03-06 RX ORDER — ONDANSETRON 4 MG/1
4 TABLET, FILM COATED ORAL DAILY PRN
Qty: 60 TABLET | Refills: 1 | Status: SHIPPED | OUTPATIENT
Start: 2025-03-06 | End: 2026-03-06

## 2025-03-06 NOTE — PROGRESS NOTES
Having more HA's with this pregnancy that with her first two.  Tylenol OK.   Patient also encouraged to drink lots of water.  No abdominal pain, but is having sciatic pain in hip (same as previous two pregnancies).  No LOF or VB  Good FM  Anatomy scan later today    Diagnoses and all orders for this visit:    1. 19 weeks gestation of pregnancy (Primary)  -     POC Urinalysis Dipstick    2. History of eclampsia    3. Prenatal care, subsequent pregnancy, second trimester    Other orders  -     ondansetron (Zofran) 4 MG tablet; Take 1 tablet by mouth Daily As Needed for Nausea or Vomiting.  Dispense: 60 tablet; Refill: 1

## 2025-04-04 ENCOUNTER — ROUTINE PRENATAL (OUTPATIENT)
Dept: OBSTETRICS AND GYNECOLOGY | Age: 35
End: 2025-04-04
Payer: COMMERCIAL

## 2025-04-04 VITALS — DIASTOLIC BLOOD PRESSURE: 82 MMHG | SYSTOLIC BLOOD PRESSURE: 142 MMHG | WEIGHT: 184 LBS | BODY MASS INDEX: 32.59 KG/M2

## 2025-04-04 DIAGNOSIS — O34.211 MATERNAL CARE DUE TO LOW TRANSVERSE UTERINE SCAR FROM PREVIOUS CESAREAN DELIVERY: ICD-10-CM

## 2025-04-04 DIAGNOSIS — Z87.59 HISTORY OF ECLAMPSIA: ICD-10-CM

## 2025-04-04 DIAGNOSIS — Z34.82 PRENATAL CARE, SUBSEQUENT PREGNANCY, SECOND TRIMESTER: ICD-10-CM

## 2025-04-04 DIAGNOSIS — Z3A.24 24 WEEKS GESTATION OF PREGNANCY: Primary | ICD-10-CM

## 2025-04-04 LAB
GLUCOSE UR STRIP-MCNC: NEGATIVE MG/DL
PROT UR STRIP-MCNC: NEGATIVE MG/DL
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
## 2025-04-28 ENCOUNTER — PATIENT OUTREACH (OUTPATIENT)
Dept: LABOR AND DELIVERY | Facility: HOSPITAL | Age: 35
End: 2025-04-28
Payer: COMMERCIAL

## 2025-04-28 NOTE — OUTREACH NOTE
[facility]  Check-In    Current Estimated Gestational Age: 27w3d      EPDS questionnaire sent to [facility] in [facility] prior to our telephone check-in this week.     Ximena Olivas RN  Maternity Nurse Navigator    4/28/2025, 10:36 CDT

## 2025-04-30 ENCOUNTER — PATIENT OUTREACH (OUTPATIENT)
Dept: LABOR AND DELIVERY | Facility: HOSPITAL | Age: 35
End: 2025-04-30
Payer: COMMERCIAL

## 2025-04-30 NOTE — OUTREACH NOTE
[facility]  Check-In    Current Estimated Gestational Age: 27w5d      Questions/Answers      Flowsheet Row Responses   Best Method for Contacting Cell   Able to keep appointments as scheduled Yes   Gender(s) and Name(s) male   Baby Active/Feeling Fetal Movemen Yes   How are you presently feeling? voices no concerns. reviewed maternal urgent warning signs, labor precautions, kick counts, states she has all the necessary items for baby for after delivery. (crib, carseat, stroller)   Supplies ready for baby Car Seat, Crib   Resource/Environmental Concerns None   Do you have any questions related to your care experience, your pregnancy, plans for delivery, any concerns, etc? No            Ximena Olivas RN  Maternity Nurse Navigator    4/30/2025, 13:34 CDT

## 2025-05-01 ENCOUNTER — ROUTINE PRENATAL (OUTPATIENT)
Dept: OBSTETRICS AND GYNECOLOGY | Age: 35
End: 2025-05-01
Payer: COMMERCIAL

## 2025-05-01 VITALS — DIASTOLIC BLOOD PRESSURE: 78 MMHG | WEIGHT: 191 LBS | BODY MASS INDEX: 33.83 KG/M2 | SYSTOLIC BLOOD PRESSURE: 128 MMHG

## 2025-05-01 DIAGNOSIS — O26.892 RH NEGATIVE STATUS DURING PREGNANCY IN SECOND TRIMESTER: ICD-10-CM

## 2025-05-01 DIAGNOSIS — Z13.1 SCREENING FOR DIABETES MELLITUS: ICD-10-CM

## 2025-05-01 DIAGNOSIS — Z67.91 RH NEGATIVE STATUS DURING PREGNANCY IN SECOND TRIMESTER: ICD-10-CM

## 2025-05-01 DIAGNOSIS — Z87.59 HISTORY OF ECLAMPSIA: ICD-10-CM

## 2025-05-01 DIAGNOSIS — Z3A.27 27 WEEKS GESTATION OF PREGNANCY: Primary | ICD-10-CM

## 2025-05-01 DIAGNOSIS — Z34.82 PRENATAL CARE, SUBSEQUENT PREGNANCY, SECOND TRIMESTER: ICD-10-CM

## 2025-05-01 LAB
CLARITY, POC: CLEAR
COLOR UR: YELLOW
GLUCOSE UR STRIP-MCNC: NEGATIVE MG/DL
PROT UR STRIP-MCNC: ABNORMAL MG/DL

## 2025-05-01 NOTE — PROGRESS NOTES
Feeling ok.  Leg cramps are biggest complaint.  Discussed magnesium and staying hydrated  GCT, Rhogam and TDaP today  EFW today 67%    Diagnoses and all orders for this visit:    1. 27 weeks gestation of pregnancy (Primary)  -     POC Urinalysis Dipstick  -     Hemoglobin  -     RPR Qualitative with Reflex to Quant  -     Antibody Screen    2. Screening for diabetes mellitus  -     Gestational Screen 1 Hr ([facility])    3. History of eclampsia    4. Prenatal care, subsequent pregnancy, second trimester

## 2025-05-02 LAB
BLD GP AB SCN SERPL QL: NEGATIVE
GLUCOSE 1H P 50 G GLC PO SERPL-MCNC: 81 MG/DL (ref 65–139)
HGB BLD-MCNC: 11.8 G/DL (ref 12–15.9)
RPR SER QL: NON REACTIVE

## 2025-05-20 ENCOUNTER — TELEPHONE (OUTPATIENT)
Dept: OBSTETRICS AND GYNECOLOGY | Age: 35
End: 2025-05-20
Payer: COMMERCIAL

## 2025-05-20 ENCOUNTER — ROUTINE PRENATAL (OUTPATIENT)
Dept: OBSTETRICS AND GYNECOLOGY | Age: 35
End: 2025-05-20
Payer: COMMERCIAL

## 2025-05-20 VITALS — DIASTOLIC BLOOD PRESSURE: 80 MMHG | SYSTOLIC BLOOD PRESSURE: 134 MMHG | BODY MASS INDEX: 34.37 KG/M2 | WEIGHT: 194 LBS

## 2025-05-20 DIAGNOSIS — Z3A.30 30 WEEKS GESTATION OF PREGNANCY: Primary | ICD-10-CM

## 2025-05-20 DIAGNOSIS — Z34.83 PRENATAL CARE, SUBSEQUENT PREGNANCY, THIRD TRIMESTER: ICD-10-CM

## 2025-05-20 DIAGNOSIS — Z87.59 HISTORY OF ECLAMPSIA: ICD-10-CM

## 2025-05-20 LAB
GLUCOSE UR STRIP-MCNC: NEGATIVE MG/DL
PROT UR STRIP-MCNC: NEGATIVE MG/DL

## 2025-05-20 NOTE — TELEPHONE ENCOUNTER
Pt stated she is waiting on a breast pump order and they have not received it yet. She is requesting her breast pump through [address]

## 2025-05-20 NOTE — PROGRESS NOTES
Chief Complaint   Patient presents with    Routine Prenatal Visit       History:  Sheila Yeung is a 34 y.o. female who presents today for follow-up for evaluation of the above:  Pt comes in today for 30 week OB visit. Over last week or so has started to have some spotty vision and headaches off and on. BP running slightly higher as well at 139/89 from home reading. Has tried to hydrate more and does feel that symptoms improved some with that. Discussed case with Dr. Thurman. Recommended labs as ordered below. Continue to monitor BP readings and symptoms. Call or to Labor and Delivery with worsening symptoms.   Feeling good movement.  Denies LOF, bleeding or contractions.   Growth US to be completed with 4D at next visit.     Assessment/Plan    Diagnoses and all orders for this visit:    1. 30 weeks gestation of pregnancy (Primary)  -     POC Urinalysis Dipstick  -     CBC Auto Differential  -     Comprehensive Metabolic Panel  -     Lactate Dehydrogenase  -     Protein / Creatinine Ratio, Urine - Urine, Clean Catch  -     Uric Acid  -     Urinalysis With Culture If Indicated -    2. History of eclampsia    3. Prenatal care, subsequent pregnancy, third trimester           An After Visit Summary was printed and given to the patient at discharge.  Return for add growth note to ultrasound next visit. Sooner if problems arise.          SANJANA Beyer  Electronically Signed

## 2025-05-21 LAB
ALBUMIN SERPL-MCNC: 3.5 G/DL (ref 3.9–4.9)
ALP SERPL-CCNC: 143 IU/L (ref 44–121)
ALT SERPL-CCNC: 33 IU/L (ref 0–32)
APPEARANCE UR: CLEAR
AST SERPL-CCNC: 30 IU/L (ref 0–40)
BACTERIA #/AREA URNS HPF: NORMAL /[HPF]
BASOPHILS # BLD AUTO: 0 X10E3/UL (ref 0–0.2)
BASOPHILS NFR BLD AUTO: 1 %
BILIRUB SERPL-MCNC: <0.2 MG/DL (ref 0–1.2)
BILIRUB UR QL STRIP: NEGATIVE
BUN SERPL-MCNC: 4 MG/DL (ref 6–20)
BUN/CREAT SERPL: 9 (ref 9–23)
CALCIUM SERPL-MCNC: 8.9 MG/DL (ref 8.7–10.2)
CASTS URNS QL MICRO: NORMAL /LPF
CHLORIDE SERPL-SCNC: 104 MMOL/L (ref 96–106)
CO2 SERPL-SCNC: 18 MMOL/L (ref 20–29)
COLOR UR: YELLOW
CREAT SERPL-MCNC: 0.43 MG/DL (ref 0.57–1)
CREAT UR-MCNC: 15.9 MG/DL
EGFRCR SERPLBLD CKD-EPI 2021: 131 ML/MIN/1.73
EOSINOPHIL # BLD AUTO: 0.1 X10E3/UL (ref 0–0.4)
EOSINOPHIL NFR BLD AUTO: 2 %
EPI CELLS #/AREA URNS HPF: NORMAL /HPF (ref 0–10)
ERYTHROCYTE [DISTWIDTH] IN BLOOD BY AUTOMATED COUNT: 11.8 % (ref 11.7–15.4)
GLOBULIN SER CALC-MCNC: 2.8 G/DL (ref 1.5–4.5)
GLUCOSE SERPL-MCNC: 75 MG/DL (ref 70–99)
GLUCOSE UR QL STRIP: NEGATIVE
HCT VFR BLD AUTO: 34.5 % (ref 34–46.6)
HGB BLD-MCNC: 11.2 G/DL (ref 11.1–15.9)
HGB UR QL STRIP: NEGATIVE
IMM GRANULOCYTES # BLD AUTO: 0.2 X10E3/UL (ref 0–0.1)
IMM GRANULOCYTES NFR BLD AUTO: 2 %
KETONES UR QL STRIP: NEGATIVE
LDH SERPL L TO P-CCNC: 206 IU/L (ref 119–226)
LEUKOCYTE ESTERASE UR QL STRIP: NEGATIVE
LYMPHOCYTES # BLD AUTO: 1.2 X10E3/UL (ref 0.7–3.1)
LYMPHOCYTES NFR BLD AUTO: 17 %
MCH RBC QN AUTO: 30.2 PG (ref 26.6–33)
MCHC RBC AUTO-ENTMCNC: 32.5 G/DL (ref 31.5–35.7)
MCV RBC AUTO: 93 FL (ref 79–97)
MICRO URNS: NORMAL
MICRO URNS: NORMAL
MONOCYTES # BLD AUTO: 0.6 X10E3/UL (ref 0.1–0.9)
MONOCYTES NFR BLD AUTO: 9 %
NEUTROPHILS # BLD AUTO: 4.6 X10E3/UL (ref 1.4–7)
NEUTROPHILS NFR BLD AUTO: 69 %
NITRITE UR QL STRIP: NEGATIVE
PH UR STRIP: 7 [PH] (ref 5–7.5)
PLATELET # BLD AUTO: 178 X10E3/UL (ref 150–450)
POTASSIUM SERPL-SCNC: 4.4 MMOL/L (ref 3.5–5.2)
PROT SERPL-MCNC: 6.3 G/DL (ref 6–8.5)
PROT UR QL STRIP: NEGATIVE
PROT UR-MCNC: 4.5 MG/DL
PROT/CREAT UR: 283 MG/G CREAT (ref 0–200)
RBC # BLD AUTO: 3.71 X10E6/UL (ref 3.77–5.28)
RBC #/AREA URNS HPF: NORMAL /HPF (ref 0–2)
SODIUM SERPL-SCNC: 137 MMOL/L (ref 134–144)
SP GR UR STRIP: 1.01 (ref 1–1.03)
URATE SERPL-MCNC: 2.6 MG/DL (ref 2.6–6.2)
URINALYSIS REFLEX: NORMAL
UROBILINOGEN UR STRIP-MCNC: 0.2 MG/DL (ref 0.2–1)
WBC # BLD AUTO: 6.6 X10E3/UL (ref 3.4–10.8)
WBC #/AREA URNS HPF: NORMAL /HPF (ref 0–5)

## 2025-05-21 NOTE — ADDENDUM NOTE
Addended by: MIGUEL A GARCIA on: 5/21/2025 03:31 PM     Modules accepted: Orders    
not applicable (Male)

## 2025-05-27 ENCOUNTER — TELEPHONE (OUTPATIENT)
Dept: OBSTETRICS AND GYNECOLOGY | Age: 35
End: 2025-05-27
Payer: COMMERCIAL

## 2025-05-27 NOTE — TELEPHONE ENCOUNTER
Pt calling to ask if she may drop 24 hour Urine off at [facility] as she picked container up here. Spoke with FREDY Romero and she advised pt may drop this off at [place]. Pt informed and voices understanding.

## 2025-05-30 ENCOUNTER — ROUTINE PRENATAL (OUTPATIENT)
Dept: OBSTETRICS AND GYNECOLOGY | Age: 35
End: 2025-05-30
Payer: COMMERCIAL

## 2025-05-30 VITALS — DIASTOLIC BLOOD PRESSURE: 80 MMHG | BODY MASS INDEX: 34.72 KG/M2 | SYSTOLIC BLOOD PRESSURE: 118 MMHG | WEIGHT: 196 LBS

## 2025-05-30 DIAGNOSIS — O26.892 RH NEGATIVE STATUS DURING PREGNANCY IN SECOND TRIMESTER: ICD-10-CM

## 2025-05-30 DIAGNOSIS — Z98.891 PREVIOUS CESAREAN SECTION: ICD-10-CM

## 2025-05-30 DIAGNOSIS — Z87.59 HISTORY OF ECLAMPSIA: ICD-10-CM

## 2025-05-30 DIAGNOSIS — Z67.91 RH NEGATIVE STATUS DURING PREGNANCY IN SECOND TRIMESTER: ICD-10-CM

## 2025-05-30 DIAGNOSIS — Z3A.32 32 WEEKS GESTATION OF PREGNANCY: Primary | ICD-10-CM

## 2025-05-30 DIAGNOSIS — Z34.83 PRENATAL CARE, SUBSEQUENT PREGNANCY, THIRD TRIMESTER: ICD-10-CM

## 2025-05-30 LAB
GLUCOSE UR STRIP-MCNC: NEGATIVE MG/DL
PROT UR STRIP-MCNC: NEGATIVE MG/DL

## 2025-05-30 RX ORDER — OXYTOCIN/0.9 % SODIUM CHLORIDE 30/500 ML
250 PLASTIC BAG, INJECTION (ML) INTRAVENOUS CONTINUOUS
OUTPATIENT
Start: 2025-05-30 | End: 2025-05-30

## 2025-05-30 RX ORDER — MISOPROSTOL 100 UG/1
800 TABLET ORAL AS NEEDED
OUTPATIENT
Start: 2025-05-30

## 2025-05-30 RX ORDER — SODIUM CHLORIDE, SODIUM LACTATE, POTASSIUM CHLORIDE, CALCIUM CHLORIDE 600; 310; 30; 20 MG/100ML; MG/100ML; MG/100ML; MG/100ML
125 INJECTION, SOLUTION INTRAVENOUS CONTINUOUS
OUTPATIENT
Start: 2025-05-30 | End: 2025-05-31

## 2025-05-30 RX ORDER — ACETAMINOPHEN 500 MG
1000 TABLET ORAL ONCE
OUTPATIENT
Start: 2025-05-30 | End: 2025-05-30

## 2025-05-30 RX ORDER — KETOROLAC TROMETHAMINE 15 MG/ML
30 INJECTION, SOLUTION INTRAMUSCULAR; INTRAVENOUS ONCE
OUTPATIENT
Start: 2025-05-30 | End: 2025-05-30

## 2025-05-30 RX ORDER — OXYTOCIN/0.9 % SODIUM CHLORIDE 30/500 ML
999 PLASTIC BAG, INJECTION (ML) INTRAVENOUS ONCE
OUTPATIENT
Start: 2025-05-30

## 2025-05-30 RX ORDER — METHYLERGONOVINE MALEATE 0.2 MG/ML
200 INJECTION INTRAVENOUS ONCE AS NEEDED
OUTPATIENT
Start: 2025-05-30

## 2025-05-30 RX ORDER — SODIUM CHLORIDE 9 MG/ML
40 INJECTION, SOLUTION INTRAVENOUS AS NEEDED
OUTPATIENT
Start: 2025-05-30

## 2025-05-30 RX ORDER — SODIUM CHLORIDE 0.9 % (FLUSH) 0.9 %
10 SYRINGE (ML) INJECTION AS NEEDED
OUTPATIENT
Start: 2025-05-30

## 2025-05-30 RX ORDER — CARBOPROST TROMETHAMINE 250 UG/ML
250 INJECTION, SOLUTION INTRAMUSCULAR AS NEEDED
OUTPATIENT
Start: 2025-05-30

## 2025-05-30 RX ORDER — SODIUM CHLORIDE 0.9 % (FLUSH) 0.9 %
10 SYRINGE (ML) INJECTION EVERY 12 HOURS SCHEDULED
OUTPATIENT
Start: 2025-05-30

## 2025-05-30 NOTE — PROGRESS NOTES
Feeling tired again.  She is also having morning sickness again.  Patient also having some HA's, but says they always resolve with tylenol.  EFW 82%, vertex, SHOLA 17cm  Scheduling repeat C/S with salpingectomy for 39 weeks.  Orders placed.  Procedure reviewed, including details of salpingectomy.  Patient understands salpingectomy to be a permanent, irreversible, procedure.    Diagnoses and all orders for this visit:    1. 32 weeks gestation of pregnancy (Primary)  -     POC Urinalysis Dipstick    2. History of eclampsia    3. Rh negative status during pregnancy in second trimester    4. Prenatal care, subsequent pregnancy, third trimester

## 2025-06-16 ENCOUNTER — HOSPITAL ENCOUNTER (OUTPATIENT)
Facility: HOSPITAL | Age: 35
Discharge: HOME | End: 2025-06-16
Attending: OBSTETRICS & GYNECOLOGY | Admitting: OBSTETRICS & GYNECOLOGY
Payer: COMMERCIAL

## 2025-06-16 ENCOUNTER — TELEPHONE (OUTPATIENT)
Dept: OBSTETRICS AND GYNECOLOGY | Age: 35
End: 2025-06-16
Payer: COMMERCIAL

## 2025-06-16 ENCOUNTER — ROUTINE PRENATAL (OUTPATIENT)
Age: 35
End: 2025-06-16
Payer: COMMERCIAL

## 2025-06-16 VITALS
OXYGEN SATURATION: 99 % | DIASTOLIC BLOOD PRESSURE: 74 MMHG | HEART RATE: 83 BPM | SYSTOLIC BLOOD PRESSURE: 127 MMHG | BODY MASS INDEX: 35.97 KG/M2 | WEIGHT: 203 LBS | TEMPERATURE: 98.7 F | HEIGHT: 63 IN

## 2025-06-16 VITALS — SYSTOLIC BLOOD PRESSURE: 116 MMHG | BODY MASS INDEX: 35.78 KG/M2 | WEIGHT: 202 LBS | DIASTOLIC BLOOD PRESSURE: 84 MMHG

## 2025-06-16 DIAGNOSIS — Z3A.34 34 WEEKS GESTATION OF PREGNANCY: Primary | ICD-10-CM

## 2025-06-16 PROBLEM — Z34.90 PREGNANCY: Status: ACTIVE | Noted: 2025-06-16

## 2025-06-16 LAB
ALBUMIN SERPL-MCNC: 3.2 G/DL (ref 3.5–5.2)
ALBUMIN/GLOB SERPL: 1.1 G/DL
ALP SERPL-CCNC: 166 U/L (ref 39–117)
ALT SERPL W P-5'-P-CCNC: 14 U/L (ref 1–33)
ANION GAP SERPL CALCULATED.3IONS-SCNC: 13 MMOL/L (ref 5–15)
AST SERPL-CCNC: 19 U/L (ref 1–32)
BASOPHILS # BLD AUTO: 0.03 10*3/MM3 (ref 0–0.2)
BASOPHILS NFR BLD AUTO: 0.4 % (ref 0–1.5)
BILIRUB SERPL-MCNC: 0.2 MG/DL (ref 0–1.2)
BUN SERPL-MCNC: 5.1 MG/DL (ref 6–20)
BUN/CREAT SERPL: 13.8 (ref 7–25)
CALCIUM SPEC-SCNC: 8.7 MG/DL (ref 8.6–10.5)
CHLORIDE SERPL-SCNC: 104 MMOL/L (ref 98–107)
CO2 SERPL-SCNC: 19 MMOL/L (ref 22–29)
CREAT SERPL-MCNC: 0.37 MG/DL (ref 0.57–1)
DEPRECATED RDW RBC AUTO: 38 FL (ref 37–54)
EGFRCR SERPLBLD CKD-EPI 2021: 135.9 ML/MIN/1.73
EOSINOPHIL # BLD AUTO: 0.15 10*3/MM3 (ref 0–0.4)
EOSINOPHIL NFR BLD AUTO: 2.1 % (ref 0.3–6.2)
ERYTHROCYTE [DISTWIDTH] IN BLOOD BY AUTOMATED COUNT: 12.1 % (ref 12.3–15.4)
GLOBULIN UR ELPH-MCNC: 2.9 GM/DL
GLUCOSE SERPL-MCNC: 74 MG/DL (ref 65–99)
GLUCOSE UR STRIP-MCNC: NEGATIVE MG/DL
HCT VFR BLD AUTO: 31.3 % (ref 34–46.6)
HGB BLD-MCNC: 10.6 G/DL (ref 12–15.9)
IMM GRANULOCYTES # BLD AUTO: 0.13 10*3/MM3 (ref 0–0.05)
IMM GRANULOCYTES NFR BLD AUTO: 1.8 % (ref 0–0.5)
LDH SERPL-CCNC: 216 U/L (ref 135–214)
LYMPHOCYTES # BLD AUTO: 1.26 10*3/MM3 (ref 0.7–3.1)
LYMPHOCYTES NFR BLD AUTO: 17.8 % (ref 19.6–45.3)
MCH RBC QN AUTO: 29.2 PG (ref 26.6–33)
MCHC RBC AUTO-ENTMCNC: 33.9 G/DL (ref 31.5–35.7)
MCV RBC AUTO: 86.2 FL (ref 79–97)
MONOCYTES # BLD AUTO: 0.55 10*3/MM3 (ref 0.1–0.9)
MONOCYTES NFR BLD AUTO: 7.8 % (ref 5–12)
NEUTROPHILS NFR BLD AUTO: 4.96 10*3/MM3 (ref 1.7–7)
NEUTROPHILS NFR BLD AUTO: 70.1 % (ref 42.7–76)
NRBC BLD AUTO-RTO: 0 /100 WBC (ref 0–0.2)
PLATELET # BLD AUTO: 148 10*3/MM3 (ref 140–450)
PMV BLD AUTO: 11.4 FL (ref 6–12)
POTASSIUM SERPL-SCNC: 3.8 MMOL/L (ref 3.5–5.2)
PROT SERPL-MCNC: 6.1 G/DL (ref 6–8.5)
PROT UR STRIP-MCNC: ABNORMAL MG/DL
RBC # BLD AUTO: 3.63 10*6/MM3 (ref 3.77–5.28)
SODIUM SERPL-SCNC: 136 MMOL/L (ref 136–145)
URATE SERPL-MCNC: 2.9 MG/DL (ref 2.4–5.7)
WBC NRBC COR # BLD AUTO: 7.08 10*3/MM3 (ref 3.4–10.8)

## 2025-06-16 PROCEDURE — 83615 LACTATE (LD) (LDH) ENZYME: CPT | Performed by: OBSTETRICS & GYNECOLOGY

## 2025-06-16 PROCEDURE — G0463 HOSPITAL OUTPT CLINIC VISIT: HCPCS

## 2025-06-16 PROCEDURE — G0378 HOSPITAL OBSERVATION PER HR: HCPCS

## 2025-06-16 PROCEDURE — 84550 ASSAY OF BLOOD/URIC ACID: CPT | Performed by: OBSTETRICS & GYNECOLOGY

## 2025-06-16 PROCEDURE — 80053 COMPREHEN METABOLIC PANEL: CPT | Performed by: OBSTETRICS & GYNECOLOGY

## 2025-06-16 PROCEDURE — 85025 COMPLETE CBC W/AUTO DIFF WBC: CPT | Performed by: OBSTETRICS & GYNECOLOGY

## 2025-06-16 NOTE — TELEPHONE ENCOUNTER
Pt calling to report recently discharged from L&D and asking If she should begin 24 hour urine today. Pt advised to begin 24 hour urine per instructions given in LDR. Pt voices understanding.

## 2025-06-16 NOTE — NURSING NOTE
Patient presents to unit from MD Office for PIH labs, serial BPs and NST.    Judi Barraza RN  12:24 CDT

## 2025-06-16 NOTE — NURSING NOTE
Patient educated on S/S of labor, educated to return to L&D for consistent, painful contractions, leaking of fluid, vaginal bleeding, or decreased fetal movement. Urgent maternal warnings signs reviewed and handout given.    Patient instructed to collect 24 hour urine and return to L&D when completed.    Judi Barraza RN  13:57 CDT

## 2025-06-17 ENCOUNTER — HOSPITAL ENCOUNTER (OUTPATIENT)
Facility: HOSPITAL | Age: 35
Discharge: HOME | End: 2025-06-17
Attending: OBSTETRICS & GYNECOLOGY | Admitting: OBSTETRICS & GYNECOLOGY
Payer: COMMERCIAL

## 2025-06-17 VITALS
HEART RATE: 92 BPM | RESPIRATION RATE: 16 BRPM | TEMPERATURE: 99 F | SYSTOLIC BLOOD PRESSURE: 113 MMHG | HEIGHT: 63 IN | DIASTOLIC BLOOD PRESSURE: 87 MMHG | BODY MASS INDEX: 35.97 KG/M2 | WEIGHT: 203 LBS

## 2025-06-17 LAB
COLLECT DURATION TIME UR: 24 HRS
CREAT UR-MCNC: 163.2 MG/DL
PROT 24H UR-MRATE: 210.6 MG/24HOURS (ref 0–150)
PROT UR-MCNC: 36.4 MG/DL
PROT/CREAT UR: 223 MG/G CREAT (ref 0–200)
SPECIMEN VOL 24H UR: 2700 ML

## 2025-06-17 PROCEDURE — G0463 HOSPITAL OUTPT CLINIC VISIT: HCPCS

## 2025-06-17 PROCEDURE — G0378 HOSPITAL OBSERVATION PER HR: HCPCS

## 2025-06-17 PROCEDURE — 81050 URINALYSIS VOLUME MEASURE: CPT | Performed by: OBSTETRICS & GYNECOLOGY

## 2025-06-17 PROCEDURE — 84156 ASSAY OF PROTEIN URINE: CPT | Performed by: OBSTETRICS & GYNECOLOGY

## 2025-06-17 NOTE — NON STRESS TEST
Sheila GENNARO Yeung, a  at 34w4d with an LONG of 2025, by Ultrasound, was seen at [facility] for a nonstress test.    Chief Complaint   Patient presents with    monitoring     24 hour urine completed at 1230 today       Patient Active Problem List   Diagnosis    Previous  section    Pregnancy       Start Time: 1044  Stop Time: 1239    Interpretation A  Nonstress Test Interpretation A: Reactive  Comments A: Verified with Judi Barraza RNC/Carolee Stock RN

## 2025-06-17 NOTE — PATIENT INSTRUCTIONS
Third Trimester of Pregnancy    The third trimester of pregnancy is from week 28 through week 40. This is months 7 through 9. The third trimester is a time when your baby is growing fast.  Body changes during your third trimester  Your body continues to change during this time. The changes usually go away after your baby is born.  Physical changes  You will continue to gain weight.  You may get stretch marks on your hips, belly, and breasts.  Your breasts will keep growing and may hurt. A yellow fluid (colostrum) may leak from your breasts. This is the first milk you're making for your baby.  Your hair may grow faster and get thicker. In some cases, you may get hair loss.  Your belly button may stick out.  You may have more swelling in your hands, face, or ankles.  Health changes  You may have heartburn.  You may feel short of breath. This is caused by the uterus that is now bigger.  You may have more aches in the pelvis, back, or thighs.  You may have more tingling or numbness in your hands, arms, and legs.  You may pee more often.  You may have trouble pooping (constipation) or swollen veins in the butt that can itch or get painful (hemorrhoids).  Other changes  You may have more problems sleeping.  You may notice the baby moving lower in your belly (dropping).  You may have more fluid coming from your vagina.  Your joints may feel loose, and you may have pain around your pelvic bone.  Follow these instructions at home:  Medicines  Take medicines only as told by your health care provider. Some medicines are not safe during pregnancy.  Your provider may change the medicines that you take.  Do not take any medicines unless told to by your provider.  Take a prenatal vitamin that has at least 600 micrograms (mcg) of folic acid.  Do not use herbal medicines, illegal drugs, or medicines that are not approved by your provider.  Eating and drinking  While you're pregnant your body needs additional nutrition to help  support your growing baby.  Talk with your provider about your nutritional needs.  Activity  Most women are able to exercise regularly during pregnancy. Exercise routines may need to change at the end of your pregnancy.  Talk to your provider about your activities and exercise routine.  Relieving pain and discomfort  Rest often with your legs raised if you have leg cramps or low back pain.  Take warm sitz baths to soothe pain from hemorrhoids. Use hemorrhoid cream if your provider says it's okay.  Wear a good, supportive bra if your breasts hurt.  Do not use hot tubs, steam rooms, or saunas.  Do not douche. Do not use tampons or scented pads.  Safety  Talk to your provider before traveling far distances.  Wear your seatbelt at all times when you're in a car.  Talk to your provider if someone hits you, hurts you, or yells at you.  Preparing for birth  To prepare for your baby:  Take childbirth and breastfeeding classes.  Visit the hospital and tour the maternity area.  Buy a rear-facing car seat. Learn how to install it in your car.  General instructions  Avoid cat litter boxes and soil used by cats. These things carry germs that can cause harm to your pregnancy and your baby.  Do not drink alcohol, smoke, vape, or use products with nicotine or tobacco in them. If you need help quitting, talk with your provider.  Keep all follow-up visits for your third trimester. Your provider will do more exams and tests during this trimester. Write down your questions. Take them to your prenatal visits. Your provider also will:  Talk with you about your overall health.  Give you advice or refer you to specialists who can help with different needs, including:  Mental health and counseling.  Foods and healthy eating. Ask for help if you need help with food.  Where to find more information  American Pregnancy Association: americanpregnancy.org  American College of Obstetricians and Gynecologists: acog.org  Office on Women's Health:  womenshealth.gov  Contact a health care provider if:  You have a headache that does not go away when you take medicine.  You have any of these problems:  You can't eat or drink.  You have nausea and vomiting.  You have watery poop (diarrhea) for 2 days or more.  You have pain when you pee, or your pee smells bad.  You have been sick for 2 days or more and aren't getting better.  Contact your provider right away if:  You have any of these coming from your vagina:  Abnormal discharge.  Bad-smelling fluid.  Bleeding.  Your baby is moving less than usual.  You have signs of labor:  You have any contractions, belly cramping, or have pain in your pelvis or lower back before 37 weeks of pregnancy ( labor).  You have regular contractions that are less than 5 minutes apart.  Your water breaks.  You have symptoms of high blood pressure or preeclampsia. These include:  A severe, throbbing headache that does not go away.  Sudden or extreme swelling of your face, hands, legs, or feet.  Vision problems:  You see spots.  You have blurry vision.  Your eyes are sensitive to light.  If you can't reach your provider, go to an urgent care or emergency room.  Get help right away if:  You faint, become confused, or can't think clearly.  You have chest pain or trouble breathing.  You have any kind of injury, such as from a fall or a car crash.  These symptoms may be an emergency. Call 911 right away.  Do not wait to see if the symptoms will go away.  Do not drive yourself to the hospital.  This information is not intended to replace advice given to you by your health care provider. Make sure you discuss any questions you have with your health care provider.  Document Revised: 2024 Document Reviewed: 2024  Elsevier Patient Education 2024 Elsevier Inc.

## 2025-06-17 NOTE — NURSING NOTE
Three total doses of vaginal cytotec pulled from [facility]. When going to administer first dose I dropped it in the floor. The second dose was administered then patient called out stating when she got up to use the bathroom and it fell out in the toilet. Third dose was given to the patient to insert herself.    Judi Barraza RN  15:46 CDT

## 2025-06-17 NOTE — PROGRESS NOTES
Chief Complaint  Routine Prenatal Visit (Pt is here for a routine 34w 3d prenatal appointment.  Pt c/o having a lot of pain in rib area on right side behind her breast. )  History of Present Illness  The patient presents for routine OB visit.     She reports experiencing pain localized to the area behind her breast, which she describes as unusual. Additionally, she mentions discomfort associated with [place] contractions, predominantly at night, and a sensation akin to sciatica across her back. She has been maintaining hydration through the consumption of Powerade.    She also notes the presence of dizziness and blurred vision, symptoms that have remained consistent over the past 1 to 2 weeks. Her daytime headaches are manageable with Tylenol, but the medication proves ineffective against her nighttime headaches. She typically consumes 1 to 2 Tylenol tablets during the day and doubles the dose at night.    She has observed mild swelling around her nose and under her eyes.      Sheila Yeung presents to [facility]    Objective   Vital Signs:   /84   Wt 91.6 kg (202 lb)   BMI 35.78 kg/m²       Physical Exam  Cardiovascular: Blood pressure is normal.  Extremities: No significant swelling in the ankles.  Reflexes: patellar 2+  Epigastric discomfort/pain noted.     Assessment & Plan  Pregnancy.     Epigastric pain/tenderness.  - Conduct urine test today.  - Perform blood work in LDR.  - Monitor blood pressure.  - Monitor baby.  - Anticipate discharge home if all results are normal.    Headaches.  - Perform blood work to evaluate the cause of headaches.  - Educate on proper use of Tylenol and potential risks of overuse.  - Discuss self-management strategies for headache relief, including hydration and rest.    Discussed case with Dr. Ji.   Pt to go to LDR for further evaluation r/t epigastric pain, blurred vision, headache, facial swelling and hx of preeclampsia.        Diagnoses and all orders for this visit:    1. 34 weeks gestation of pregnancy (Primary)  -     POC Urinalysis Dipstick  -     Protein / Creatinine Ratio, Urine - Urine, Clean Catch             Follow Up   Return for Next scheduled follow up.    Patient was given instructions and counseling regarding her condition or for health maintenance advice. Please see specific information pulled into the AVS if appropriate.       Patient or patient representative verbalized consent for the use of Ambient Listening during the visit with  SANJANA Marte for chart documentation. 6/16/2025  20:44 CDT

## 2025-06-18 ENCOUNTER — ROUTINE PRENATAL (OUTPATIENT)
Dept: OBSTETRICS AND GYNECOLOGY | Age: 35
End: 2025-06-18
Payer: COMMERCIAL

## 2025-06-18 VITALS — BODY MASS INDEX: 36.36 KG/M2 | WEIGHT: 202 LBS | SYSTOLIC BLOOD PRESSURE: 124 MMHG | DIASTOLIC BLOOD PRESSURE: 90 MMHG

## 2025-06-18 DIAGNOSIS — Z34.83 PRENATAL CARE, SUBSEQUENT PREGNANCY, THIRD TRIMESTER: ICD-10-CM

## 2025-06-18 DIAGNOSIS — Z87.59 HISTORY OF ECLAMPSIA: ICD-10-CM

## 2025-06-18 DIAGNOSIS — Z3A.34 34 WEEKS GESTATION OF PREGNANCY: Primary | ICD-10-CM

## 2025-06-18 DIAGNOSIS — Z98.891 PREVIOUS CESAREAN SECTION: ICD-10-CM

## 2025-06-18 LAB
GLUCOSE UR STRIP-MCNC: NEGATIVE MG/DL
PROT UR STRIP-MCNC: ABNORMAL MG/DL

## 2025-06-18 NOTE — PROGRESS NOTES
Still with HA's that go away with tylenol.  At night she is always more uncomfortable, with both B-H contractions and HA's.    BP elevated for first time today, but only 124/90.  No swelling.  No LOF or VB.  Good FM  Will increase to weekly visits with BPP's  C/S already scheduled for 39 weeks at 0745 on the , but patient aware that it will get moved up if BP becomes a problem.    Diagnoses and all orders for this visit:    1. 34 weeks gestation of pregnancy (Primary)  -     POC Urinalysis Dipstick    2. Prenatal care, subsequent pregnancy, third trimester    3. History of eclampsia    4. Previous  section

## 2025-06-23 ENCOUNTER — PATIENT OUTREACH (OUTPATIENT)
Dept: LABOR AND DELIVERY | Facility: HOSPITAL | Age: 35
End: 2025-06-23
Payer: COMMERCIAL

## 2025-06-23 NOTE — OUTREACH NOTE
[facility]  Check-In    Current Estimated Gestational Age: 35w3d      EPDS questionnaire sent to [facility] in [facility] prior to our telephone check-in this week.     Ximena Olivas RN  Maternity Nurse Navigator    6/23/2025, 14:21 CDT

## 2025-06-26 ENCOUNTER — PATIENT OUTREACH (OUTPATIENT)
Dept: LABOR AND DELIVERY | Facility: HOSPITAL | Age: 35
End: 2025-06-26
Payer: COMMERCIAL

## 2025-06-26 NOTE — OUTREACH NOTE
[facility]  Check-In    Current Estimated Gestational Age: 35w6d      Questions/Answers      Flowsheet Row Responses   Best Method for Contacting Cell   Baby Active/Feeling Fetal Movemen Yes   How are you presently feeling? reviewed urgent maternal warning signs, labor precautions, fetal kick counts, states she has all the necessary items for baby and herself. Resource letter sent to [facility] in [facility].   Supplies ready for baby Breast Pump, Car Seat, Clothing, Crib, Diapers, Feeding Supplies   Resource/Environmental Concerns None   Do you have any questions related to your care experience, your pregnancy, plans for delivery, any concerns, etc? No            Ximena Olivas RN  Maternity Nurse Navigator    6/26/2025, 13:04 CDT

## 2025-06-27 ENCOUNTER — ROUTINE PRENATAL (OUTPATIENT)
Dept: OBSTETRICS AND GYNECOLOGY | Age: 35
End: 2025-06-27
Payer: COMMERCIAL

## 2025-06-27 VITALS — WEIGHT: 205 LBS | BODY MASS INDEX: 36.9 KG/M2 | SYSTOLIC BLOOD PRESSURE: 128 MMHG | DIASTOLIC BLOOD PRESSURE: 88 MMHG

## 2025-06-27 DIAGNOSIS — Z34.83 PRENATAL CARE, SUBSEQUENT PREGNANCY, THIRD TRIMESTER: ICD-10-CM

## 2025-06-27 DIAGNOSIS — O36.63X0 EXCESSIVE FETAL GROWTH AFFECTING MANAGEMENT OF PREGNANCY IN THIRD TRIMESTER, SINGLE OR UNSPECIFIED FETUS: ICD-10-CM

## 2025-06-27 DIAGNOSIS — Z3A.36 36 WEEKS GESTATION OF PREGNANCY: Primary | ICD-10-CM

## 2025-06-27 DIAGNOSIS — Z87.59 HISTORY OF ECLAMPSIA: ICD-10-CM

## 2025-06-27 DIAGNOSIS — Z98.891 PREVIOUS CESAREAN SECTION: ICD-10-CM

## 2025-06-27 LAB
GLUCOSE UR STRIP-MCNC: NEGATIVE MG/DL
PROT UR STRIP-MCNC: ABNORMAL MG/DL

## 2025-06-27 NOTE — PROGRESS NOTES
Hot because it is summer, and also having some intermittent contractions that are mostly at night.  EFW today 99% with AC 99.8%  BPP   No LOF or VB  Good FM  Patient has no questions or concerns.  Repeat C/S already scheduled    Diagnoses and all orders for this visit:    1. 36 weeks gestation of pregnancy (Primary)  -     POC Urinalysis Dipstick    2. History of eclampsia    3. Previous  section    4. Prenatal care, subsequent pregnancy, third trimester    5. Excessive fetal growth affecting management of pregnancy in third trimester, single or unspecified fetus

## 2025-07-03 ENCOUNTER — ROUTINE PRENATAL (OUTPATIENT)
Dept: OBSTETRICS AND GYNECOLOGY | Age: 35
End: 2025-07-03
Payer: COMMERCIAL

## 2025-07-03 ENCOUNTER — HOSPITAL ENCOUNTER (OUTPATIENT)
Facility: HOSPITAL | Age: 35
Setting detail: OBSERVATION
Discharge: HOME | End: 2025-07-03
Attending: OBSTETRICS & GYNECOLOGY | Admitting: OBSTETRICS & GYNECOLOGY
Payer: COMMERCIAL

## 2025-07-03 VITALS — WEIGHT: 206 LBS | SYSTOLIC BLOOD PRESSURE: 126 MMHG | DIASTOLIC BLOOD PRESSURE: 78 MMHG | BODY MASS INDEX: 37.08 KG/M2

## 2025-07-03 VITALS — DIASTOLIC BLOOD PRESSURE: 69 MMHG | SYSTOLIC BLOOD PRESSURE: 115 MMHG

## 2025-07-03 DIAGNOSIS — Z98.891 PREVIOUS CESAREAN SECTION: ICD-10-CM

## 2025-07-03 DIAGNOSIS — O42.90 PREMATURE RUPTURE OF MEMBRANES, UNSPECIFIED DURATION TO ONSET OF LABOR, UNSPECIFIED GESTATIONAL AGE: ICD-10-CM

## 2025-07-03 DIAGNOSIS — O21.9 NAUSEA AND VOMITING DURING PREGNANCY: ICD-10-CM

## 2025-07-03 DIAGNOSIS — Z3A.36 36 WEEKS GESTATION OF PREGNANCY: Primary | ICD-10-CM

## 2025-07-03 DIAGNOSIS — Z87.59 HISTORY OF ECLAMPSIA: ICD-10-CM

## 2025-07-03 LAB
A1 MICROGLOB PLACENTAL VAG QL: NEGATIVE
ALBUMIN SERPL-MCNC: 3.6 G/DL (ref 3.5–5.2)
ALBUMIN/GLOB SERPL: 1.1 G/DL
ALP SERPL-CCNC: 226 U/L (ref 39–117)
ALT SERPL W P-5'-P-CCNC: 19 U/L (ref 1–33)
ANION GAP SERPL CALCULATED.3IONS-SCNC: 15 MMOL/L (ref 5–15)
AST SERPL-CCNC: 28 U/L (ref 1–32)
BASOPHILS # BLD AUTO: 0.03 10*3/MM3 (ref 0–0.2)
BASOPHILS NFR BLD AUTO: 0.4 % (ref 0–1.5)
BILIRUB SERPL-MCNC: 0.3 MG/DL (ref 0–1.2)
BUN SERPL-MCNC: 6.1 MG/DL (ref 6–20)
BUN/CREAT SERPL: 13.3 (ref 7–25)
CALCIUM SPEC-SCNC: 8.7 MG/DL (ref 8.6–10.5)
CHLORIDE SERPL-SCNC: 104 MMOL/L (ref 98–107)
CO2 SERPL-SCNC: 18 MMOL/L (ref 22–29)
CREAT SERPL-MCNC: 0.46 MG/DL (ref 0.57–1)
CREAT UR-MCNC: 96.2 MG/DL
DEPRECATED RDW RBC AUTO: 38.1 FL (ref 37–54)
EGFRCR SERPLBLD CKD-EPI 2021: 129 ML/MIN/1.73
EOSINOPHIL # BLD AUTO: 0.18 10*3/MM3 (ref 0–0.4)
EOSINOPHIL NFR BLD AUTO: 2.2 % (ref 0.3–6.2)
ERYTHROCYTE [DISTWIDTH] IN BLOOD BY AUTOMATED COUNT: 12.4 % (ref 12.3–15.4)
GLOBULIN UR ELPH-MCNC: 3.2 GM/DL
GLUCOSE SERPL-MCNC: 77 MG/DL (ref 65–99)
GLUCOSE UR STRIP-MCNC: NEGATIVE MG/DL
HCT VFR BLD AUTO: 34 % (ref 34–46.6)
HGB BLD-MCNC: 11.4 G/DL (ref 12–15.9)
IMM GRANULOCYTES # BLD AUTO: 0.17 10*3/MM3 (ref 0–0.05)
IMM GRANULOCYTES NFR BLD AUTO: 2.1 % (ref 0–0.5)
LDH SERPL-CCNC: 269 U/L (ref 135–214)
LYMPHOCYTES # BLD AUTO: 1.38 10*3/MM3 (ref 0.7–3.1)
LYMPHOCYTES NFR BLD AUTO: 16.7 % (ref 19.6–45.3)
MCH RBC QN AUTO: 28.6 PG (ref 26.6–33)
MCHC RBC AUTO-ENTMCNC: 33.5 G/DL (ref 31.5–35.7)
MCV RBC AUTO: 85.2 FL (ref 79–97)
MONOCYTES # BLD AUTO: 0.57 10*3/MM3 (ref 0.1–0.9)
MONOCYTES NFR BLD AUTO: 6.9 % (ref 5–12)
NEUTROPHILS NFR BLD AUTO: 5.95 10*3/MM3 (ref 1.7–7)
NEUTROPHILS NFR BLD AUTO: 71.7 % (ref 42.7–76)
NRBC BLD AUTO-RTO: 0 /100 WBC (ref 0–0.2)
PLATELET # BLD AUTO: 147 10*3/MM3 (ref 140–450)
PMV BLD AUTO: 11.8 FL (ref 6–12)
POTASSIUM SERPL-SCNC: 4.2 MMOL/L (ref 3.5–5.2)
PROT ?TM UR-MCNC: 39.7 MG/DL
PROT SERPL-MCNC: 6.8 G/DL (ref 6–8.5)
PROT UR STRIP-MCNC: NEGATIVE MG/DL
PROT/CREAT UR: 412.7 MG/G CREA (ref 0–200)
RBC # BLD AUTO: 3.99 10*6/MM3 (ref 3.77–5.28)
SODIUM SERPL-SCNC: 137 MMOL/L (ref 136–145)
URATE SERPL-MCNC: 4.1 MG/DL (ref 2.4–5.7)
WBC NRBC COR # BLD AUTO: 8.28 10*3/MM3 (ref 3.4–10.8)

## 2025-07-03 PROCEDURE — 25010000002 ONDANSETRON PER 1 MG: Performed by: OBSTETRICS & GYNECOLOGY

## 2025-07-03 PROCEDURE — 83615 LACTATE (LD) (LDH) ENZYME: CPT | Performed by: OBSTETRICS & GYNECOLOGY

## 2025-07-03 PROCEDURE — 96374 THER/PROPH/DIAG INJ IV PUSH: CPT

## 2025-07-03 PROCEDURE — 25810000003 LACTATED RINGERS SOLUTION: Performed by: OBSTETRICS & GYNECOLOGY

## 2025-07-03 PROCEDURE — 84156 ASSAY OF PROTEIN URINE: CPT | Performed by: OBSTETRICS & GYNECOLOGY

## 2025-07-03 PROCEDURE — 82570 ASSAY OF URINE CREATININE: CPT | Performed by: OBSTETRICS & GYNECOLOGY

## 2025-07-03 PROCEDURE — 80053 COMPREHEN METABOLIC PANEL: CPT | Performed by: OBSTETRICS & GYNECOLOGY

## 2025-07-03 PROCEDURE — 85025 COMPLETE CBC W/AUTO DIFF WBC: CPT | Performed by: OBSTETRICS & GYNECOLOGY

## 2025-07-03 PROCEDURE — 84550 ASSAY OF BLOOD/URIC ACID: CPT | Performed by: OBSTETRICS & GYNECOLOGY

## 2025-07-03 PROCEDURE — G0463 HOSPITAL OUTPT CLINIC VISIT: HCPCS

## 2025-07-03 PROCEDURE — G0378 HOSPITAL OBSERVATION PER HR: HCPCS

## 2025-07-03 PROCEDURE — 84112 EVAL AMNIOTIC FLUID PROTEIN: CPT | Performed by: OBSTETRICS & GYNECOLOGY

## 2025-07-03 RX ORDER — SODIUM CHLORIDE 0.9 % (FLUSH) 0.9 %
10 SYRINGE (ML) INJECTION EVERY 12 HOURS SCHEDULED
Status: DISCONTINUED | OUTPATIENT
Start: 2025-07-03 | End: 2025-07-03 | Stop reason: HOSPADM

## 2025-07-03 RX ORDER — ONDANSETRON 2 MG/ML
4 INJECTION INTRAMUSCULAR; INTRAVENOUS ONCE
Status: COMPLETED | OUTPATIENT
Start: 2025-07-03 | End: 2025-07-03

## 2025-07-03 RX ORDER — SODIUM CHLORIDE 0.9 % (FLUSH) 0.9 %
10 SYRINGE (ML) INJECTION AS NEEDED
Status: DISCONTINUED | OUTPATIENT
Start: 2025-07-03 | End: 2025-07-03 | Stop reason: HOSPADM

## 2025-07-03 RX ADMIN — ONDANSETRON 4 MG: 2 INJECTION INTRAMUSCULAR; INTRAVENOUS at 12:01

## 2025-07-03 RX ADMIN — SODIUM CHLORIDE, POTASSIUM CHLORIDE, SODIUM LACTATE AND CALCIUM CHLORIDE 1000 ML: 600; 310; 30; 20 INJECTION, SOLUTION INTRAVENOUS at 12:02

## 2025-07-03 NOTE — PROGRESS NOTES
"Increased N/V for past two days.  Currently with emesis bags in office.  She took zofran this AM  + RUQ pain that started about two weeks ago, and has increased so that it is constant now  Patient says intermittent swelling has been a little more this week  + HA, but that is not new.  She has been having HA's during the pregnancy, but they have been worse this week.  + seeing spots in her vision now, that she describes as \"sparkley\", which is also new.    + ? LOF every time she stands up, but says that it is really subtle.  + contractions, but still irregular.  BPP today , SHOLA 14.4 cm    Diagnoses and all orders for this visit:    1. 36 weeks gestation of pregnancy (Primary)  -     POC Urinalysis Dipstick    2. Premature rupture of membranes, unspecified duration to onset of labor, unspecified gestational age  -     Rapid Assay For ROM - Amniotic Fluid, Amniotic Sac    3. History of eclampsia    4. Previous  section    5. Nausea and vomiting during pregnancy    Patient department delivery for PIH labs, LR fluids, and IV Zofran.  Blood pressure in the office is normal today, but patient has all the other traditional symptoms of preeclampsia, along with a history of eclamptic seizures, so threshold for suspicion is low  "

## 2025-07-04 ENCOUNTER — ANESTHESIA (OUTPATIENT)
Dept: LABOR AND DELIVERY | Facility: HOSPITAL | Age: 35
End: 2025-07-04
Payer: COMMERCIAL

## 2025-07-04 ENCOUNTER — HOSPITAL ENCOUNTER (INPATIENT)
Facility: HOSPITAL | Age: 35
LOS: 4 days | Discharge: HOME | End: 2025-07-08
Attending: OBSTETRICS & GYNECOLOGY | Admitting: OBSTETRICS & GYNECOLOGY
Payer: COMMERCIAL

## 2025-07-04 ENCOUNTER — ANESTHESIA EVENT (OUTPATIENT)
Dept: LABOR AND DELIVERY | Facility: HOSPITAL | Age: 35
End: 2025-07-04
Payer: COMMERCIAL

## 2025-07-04 DIAGNOSIS — Z98.891 STATUS POST REPEAT LOW TRANSVERSE CESAREAN SECTION: Primary | ICD-10-CM

## 2025-07-04 DIAGNOSIS — Z98.891 PREVIOUS CESAREAN SECTION: ICD-10-CM

## 2025-07-04 PROBLEM — Z34.90 PREGNANCY: Status: RESOLVED | Noted: 2025-06-16 | Resolved: 2025-07-04

## 2025-07-04 PROBLEM — O34.219 PREVIOUS CESAREAN DELIVERY AFFECTING PREGNANCY: Status: ACTIVE | Noted: 2025-07-04

## 2025-07-04 PROBLEM — O34.219 PREVIOUS CESAREAN DELIVERY AFFECTING PREGNANCY: Status: RESOLVED | Noted: 2025-07-04 | Resolved: 2025-07-04

## 2025-07-04 LAB
ABO GROUP BLD: NORMAL
BLD GP AB SCN SERPL QL: NEGATIVE
DEPRECATED RDW RBC AUTO: 38.5 FL (ref 37–54)
ERYTHROCYTE [DISTWIDTH] IN BLOOD BY AUTOMATED COUNT: 12.3 % (ref 12.3–15.4)
HCT VFR BLD AUTO: 31.8 % (ref 34–46.6)
HGB BLD-MCNC: 10.7 G/DL (ref 12–15.9)
MCH RBC QN AUTO: 29 PG (ref 26.6–33)
MCHC RBC AUTO-ENTMCNC: 33.6 G/DL (ref 31.5–35.7)
MCV RBC AUTO: 86.2 FL (ref 79–97)
PLATELET # BLD AUTO: 162 10*3/MM3 (ref 140–450)
PMV BLD AUTO: 12 FL (ref 6–12)
RBC # BLD AUTO: 3.69 10*6/MM3 (ref 3.77–5.28)
RH BLD: NEGATIVE
T&S EXPIRATION DATE: NORMAL
WBC NRBC COR # BLD AUTO: 9.74 10*3/MM3 (ref 3.4–10.8)

## 2025-07-04 PROCEDURE — 88302 TISSUE EXAM BY PATHOLOGIST: CPT | Performed by: OBSTETRICS & GYNECOLOGY

## 2025-07-04 PROCEDURE — 25010000002 HYDROMORPHONE 1 MG/ML SOLUTION: Performed by: NURSE ANESTHETIST, CERTIFIED REGISTERED

## 2025-07-04 PROCEDURE — 25010000002 FAMOTIDINE 10 MG/ML SOLUTION: Performed by: NURSE ANESTHETIST, CERTIFIED REGISTERED

## 2025-07-04 PROCEDURE — 25010000002 BUPIVACAINE PF 0.75 % SOLUTION: Performed by: NURSE ANESTHETIST, CERTIFIED REGISTERED

## 2025-07-04 PROCEDURE — S0260 H&P FOR SURGERY: HCPCS | Performed by: OBSTETRICS & GYNECOLOGY

## 2025-07-04 PROCEDURE — 85027 COMPLETE CBC AUTOMATED: CPT | Performed by: OBSTETRICS & GYNECOLOGY

## 2025-07-04 PROCEDURE — 25810000003 LACTATED RINGERS PER 1000 ML: Performed by: OBSTETRICS & GYNECOLOGY

## 2025-07-04 PROCEDURE — 88307 TISSUE EXAM BY PATHOLOGIST: CPT | Performed by: OBSTETRICS & GYNECOLOGY

## 2025-07-04 PROCEDURE — 25010000002 ONDANSETRON PER 1 MG: Performed by: NURSE ANESTHETIST, CERTIFIED REGISTERED

## 2025-07-04 PROCEDURE — 25010000002 MAGNESIUM SULFATE 20 GM/500ML SOLUTION: Performed by: OBSTETRICS & GYNECOLOGY

## 2025-07-04 PROCEDURE — 25010000002 METOCLOPRAMIDE PER 10 MG: Performed by: NURSE ANESTHETIST, CERTIFIED REGISTERED

## 2025-07-04 PROCEDURE — 0UT70ZZ RESECTION OF BILATERAL FALLOPIAN TUBES, OPEN APPROACH: ICD-10-PCS | Performed by: OBSTETRICS & GYNECOLOGY

## 2025-07-04 PROCEDURE — 25010000002 CEFAZOLIN PER 500 MG: Performed by: OBSTETRICS & GYNECOLOGY

## 2025-07-04 PROCEDURE — 25010000002 KETOROLAC TROMETHAMINE PER 15 MG: Performed by: NURSE ANESTHETIST, CERTIFIED REGISTERED

## 2025-07-04 PROCEDURE — 86901 BLOOD TYPING SEROLOGIC RH(D): CPT | Performed by: OBSTETRICS & GYNECOLOGY

## 2025-07-04 PROCEDURE — 86850 RBC ANTIBODY SCREEN: CPT | Performed by: OBSTETRICS & GYNECOLOGY

## 2025-07-04 PROCEDURE — 59515 CESAREAN DELIVERY: CPT | Performed by: OBSTETRICS & GYNECOLOGY

## 2025-07-04 PROCEDURE — 86780 TREPONEMA PALLIDUM: CPT | Performed by: OBSTETRICS & GYNECOLOGY

## 2025-07-04 PROCEDURE — 25010000002 OXYTOCIN PER 10 UNITS: Performed by: NURSE ANESTHETIST, CERTIFIED REGISTERED

## 2025-07-04 PROCEDURE — 25010000002 DEXAMETHASONE PER 1 MG: Performed by: NURSE ANESTHETIST, CERTIFIED REGISTERED

## 2025-07-04 PROCEDURE — 58611 LIGATE OVIDUCT(S) ADD-ON: CPT | Performed by: OBSTETRICS & GYNECOLOGY

## 2025-07-04 PROCEDURE — 86900 BLOOD TYPING SEROLOGIC ABO: CPT | Performed by: OBSTETRICS & GYNECOLOGY

## 2025-07-04 DEVICE — HEMOST ABS SURGICEL PWDR 3GM: Type: IMPLANTABLE DEVICE | Status: FUNCTIONAL

## 2025-07-04 RX ORDER — ONDANSETRON 2 MG/ML
4 INJECTION INTRAMUSCULAR; INTRAVENOUS EVERY 6 HOURS PRN
Status: DISCONTINUED | OUTPATIENT
Start: 2025-07-04 | End: 2025-07-08 | Stop reason: HOSPADM

## 2025-07-04 RX ORDER — CITRIC ACID/SODIUM CITRATE 334-500MG
15 SOLUTION, ORAL ORAL ONCE
Status: COMPLETED | OUTPATIENT
Start: 2025-07-04 | End: 2025-07-04

## 2025-07-04 RX ORDER — ACETAMINOPHEN 500 MG
1000 TABLET ORAL ONCE
Status: COMPLETED | OUTPATIENT
Start: 2025-07-04 | End: 2025-07-04

## 2025-07-04 RX ORDER — CARBOPROST TROMETHAMINE 250 UG/ML
250 INJECTION, SOLUTION INTRAMUSCULAR AS NEEDED
Status: DISCONTINUED | OUTPATIENT
Start: 2025-07-05 | End: 2025-07-05 | Stop reason: HOSPADM

## 2025-07-04 RX ORDER — SIMETHICONE 80 MG
80 TABLET,CHEWABLE ORAL 4 TIMES DAILY PRN
Status: DISCONTINUED | OUTPATIENT
Start: 2025-07-04 | End: 2025-07-08 | Stop reason: HOSPADM

## 2025-07-04 RX ORDER — HYDROCODONE BITARTRATE AND ACETAMINOPHEN 5; 325 MG/1; MG/1
1 TABLET ORAL EVERY 4 HOURS PRN
Status: DISCONTINUED | OUTPATIENT
Start: 2025-07-04 | End: 2025-07-08 | Stop reason: HOSPADM

## 2025-07-04 RX ORDER — METHYLERGONOVINE MALEATE 0.2 MG/ML
200 INJECTION INTRAVENOUS ONCE AS NEEDED
Status: DISCONTINUED | OUTPATIENT
Start: 2025-07-04 | End: 2025-07-05 | Stop reason: HOSPADM

## 2025-07-04 RX ORDER — SODIUM CHLORIDE 0.9 % (FLUSH) 0.9 %
10 SYRINGE (ML) INJECTION EVERY 12 HOURS SCHEDULED
Status: DISCONTINUED | OUTPATIENT
Start: 2025-07-04 | End: 2025-07-05 | Stop reason: HOSPADM

## 2025-07-04 RX ORDER — ONDANSETRON 2 MG/ML
INJECTION INTRAMUSCULAR; INTRAVENOUS AS NEEDED
Status: DISCONTINUED | OUTPATIENT
Start: 2025-07-04 | End: 2025-07-04 | Stop reason: SURG

## 2025-07-04 RX ORDER — ACETAMINOPHEN 500 MG
1000 TABLET ORAL EVERY 6 HOURS
Status: DISPENSED | OUTPATIENT
Start: 2025-07-05 | End: 2025-07-06

## 2025-07-04 RX ORDER — PRENATAL VIT/IRON FUM/FOLIC AC 27MG-0.8MG
1 TABLET ORAL DAILY
Status: DISCONTINUED | OUTPATIENT
Start: 2025-07-05 | End: 2025-07-08 | Stop reason: HOSPADM

## 2025-07-04 RX ORDER — OXYTOCIN/0.9 % SODIUM CHLORIDE 30/500 ML
999 PLASTIC BAG, INJECTION (ML) INTRAVENOUS ONCE
Status: DISCONTINUED | OUTPATIENT
Start: 2025-07-04 | End: 2025-07-05 | Stop reason: HOSPADM

## 2025-07-04 RX ORDER — OXYTOCIN/0.9 % SODIUM CHLORIDE 30/500 ML
125 PLASTIC BAG, INJECTION (ML) INTRAVENOUS ONCE AS NEEDED
Status: DISCONTINUED | OUTPATIENT
Start: 2025-07-04 | End: 2025-07-08 | Stop reason: HOSPADM

## 2025-07-04 RX ORDER — SODIUM CHLORIDE 9 MG/ML
40 INJECTION, SOLUTION INTRAVENOUS AS NEEDED
Status: DISCONTINUED | OUTPATIENT
Start: 2025-07-04 | End: 2025-07-05 | Stop reason: HOSPADM

## 2025-07-04 RX ORDER — ONDANSETRON 2 MG/ML
4 INJECTION INTRAMUSCULAR; INTRAVENOUS ONCE AS NEEDED
Status: DISCONTINUED | OUTPATIENT
Start: 2025-07-04 | End: 2025-07-05 | Stop reason: HOSPADM

## 2025-07-04 RX ORDER — MISOPROSTOL 200 UG/1
800 TABLET ORAL AS NEEDED
Status: DISCONTINUED | OUTPATIENT
Start: 2025-07-04 | End: 2025-07-05 | Stop reason: HOSPADM

## 2025-07-04 RX ORDER — SODIUM CHLORIDE 0.9 % (FLUSH) 0.9 %
3 SYRINGE (ML) INJECTION EVERY 12 HOURS SCHEDULED
Status: DISCONTINUED | OUTPATIENT
Start: 2025-07-04 | End: 2025-07-08 | Stop reason: HOSPADM

## 2025-07-04 RX ORDER — SODIUM CHLORIDE, SODIUM LACTATE, POTASSIUM CHLORIDE, CALCIUM CHLORIDE 600; 310; 30; 20 MG/100ML; MG/100ML; MG/100ML; MG/100ML
25 INJECTION, SOLUTION INTRAVENOUS CONTINUOUS
Status: DISPENSED | OUTPATIENT
Start: 2025-07-04 | End: 2025-07-05

## 2025-07-04 RX ORDER — ENOXAPARIN SODIUM 100 MG/ML
40 INJECTION SUBCUTANEOUS NIGHTLY
Status: DISCONTINUED | OUTPATIENT
Start: 2025-07-05 | End: 2025-07-08 | Stop reason: HOSPADM

## 2025-07-04 RX ORDER — FAMOTIDINE 10 MG/ML
20 INJECTION, SOLUTION INTRAVENOUS ONCE AS NEEDED
Status: COMPLETED | OUTPATIENT
Start: 2025-07-04 | End: 2025-07-04

## 2025-07-04 RX ORDER — SODIUM CHLORIDE 0.9 % (FLUSH) 0.9 %
3-10 SYRINGE (ML) INJECTION AS NEEDED
Status: DISCONTINUED | OUTPATIENT
Start: 2025-07-04 | End: 2025-07-08 | Stop reason: HOSPADM

## 2025-07-04 RX ORDER — BUPIVACAINE HYDROCHLORIDE 7.5 MG/ML
INJECTION, SOLUTION EPIDURAL; RETROBULBAR AS NEEDED
Status: DISCONTINUED | OUTPATIENT
Start: 2025-07-04 | End: 2025-07-04 | Stop reason: SURG

## 2025-07-04 RX ORDER — CARBOPROST TROMETHAMINE 250 UG/ML
250 INJECTION, SOLUTION INTRAMUSCULAR AS NEEDED
Status: DISCONTINUED | OUTPATIENT
Start: 2025-07-04 | End: 2025-07-04

## 2025-07-04 RX ORDER — DEXAMETHASONE SODIUM PHOSPHATE 4 MG/ML
INJECTION, SOLUTION INTRA-ARTICULAR; INTRALESIONAL; INTRAMUSCULAR; INTRAVENOUS; SOFT TISSUE AS NEEDED
Status: DISCONTINUED | OUTPATIENT
Start: 2025-07-04 | End: 2025-07-04 | Stop reason: SURG

## 2025-07-04 RX ORDER — ACETAMINOPHEN 325 MG/1
650 TABLET ORAL EVERY 6 HOURS
Status: DISCONTINUED | OUTPATIENT
Start: 2025-07-06 | End: 2025-07-08 | Stop reason: HOSPADM

## 2025-07-04 RX ORDER — PHENYLEPHRINE HCL IN 0.9% NACL 1 MG/10 ML
SYRINGE (ML) INTRAVENOUS AS NEEDED
Status: DISCONTINUED | OUTPATIENT
Start: 2025-07-04 | End: 2025-07-04 | Stop reason: SURG

## 2025-07-04 RX ORDER — HYDROCODONE BITARTRATE AND ACETAMINOPHEN 10; 325 MG/1; MG/1
1 TABLET ORAL EVERY 4 HOURS PRN
Status: DISCONTINUED | OUTPATIENT
Start: 2025-07-04 | End: 2025-07-08 | Stop reason: HOSPADM

## 2025-07-04 RX ORDER — KETOROLAC TROMETHAMINE 30 MG/ML
30 INJECTION, SOLUTION INTRAMUSCULAR; INTRAVENOUS ONCE
Status: DISCONTINUED | OUTPATIENT
Start: 2025-07-04 | End: 2025-07-05 | Stop reason: HOSPADM

## 2025-07-04 RX ORDER — EPHEDRINE SULFATE 50 MG/ML
INJECTION INTRAVENOUS AS NEEDED
Status: DISCONTINUED | OUTPATIENT
Start: 2025-07-04 | End: 2025-07-04 | Stop reason: SURG

## 2025-07-04 RX ORDER — SODIUM CHLORIDE 0.9 % (FLUSH) 0.9 %
10 SYRINGE (ML) INJECTION AS NEEDED
Status: DISCONTINUED | OUTPATIENT
Start: 2025-07-04 | End: 2025-07-05 | Stop reason: HOSPADM

## 2025-07-04 RX ORDER — METOCLOPRAMIDE HYDROCHLORIDE 5 MG/ML
10 INJECTION INTRAMUSCULAR; INTRAVENOUS ONCE
Status: COMPLETED | OUTPATIENT
Start: 2025-07-04 | End: 2025-07-04

## 2025-07-04 RX ORDER — MAGNESIUM SULFATE HEPTAHYDRATE 40 MG/ML
2 INJECTION, SOLUTION INTRAVENOUS CONTINUOUS
Status: DISPENSED | OUTPATIENT
Start: 2025-07-04 | End: 2025-07-05

## 2025-07-04 RX ORDER — NALBUPHINE HYDROCHLORIDE 10 MG/ML
2.5 INJECTION INTRAMUSCULAR; INTRAVENOUS; SUBCUTANEOUS EVERY 4 HOURS PRN
Status: ACTIVE | OUTPATIENT
Start: 2025-07-04 | End: 2025-07-05

## 2025-07-04 RX ORDER — SODIUM CHLORIDE, SODIUM LACTATE, POTASSIUM CHLORIDE, CALCIUM CHLORIDE 600; 310; 30; 20 MG/100ML; MG/100ML; MG/100ML; MG/100ML
125 INJECTION, SOLUTION INTRAVENOUS CONTINUOUS
Status: DISPENSED | OUTPATIENT
Start: 2025-07-04 | End: 2025-07-05

## 2025-07-04 RX ORDER — SODIUM CHLORIDE 9 MG/ML
40 INJECTION, SOLUTION INTRAVENOUS AS NEEDED
Status: CANCELLED | OUTPATIENT
Start: 2025-07-04

## 2025-07-04 RX ORDER — IBUPROFEN 600 MG/1
600 TABLET, FILM COATED ORAL EVERY 6 HOURS
Status: DISCONTINUED | OUTPATIENT
Start: 2025-07-06 | End: 2025-07-08 | Stop reason: HOSPADM

## 2025-07-04 RX ORDER — OXYTOCIN/0.9 % SODIUM CHLORIDE 30/500 ML
250 PLASTIC BAG, INJECTION (ML) INTRAVENOUS CONTINUOUS
Status: ACTIVE | OUTPATIENT
Start: 2025-07-04 | End: 2025-07-05

## 2025-07-04 RX ORDER — OXYTOCIN 10 [USP'U]/ML
INJECTION, SOLUTION INTRAMUSCULAR; INTRAVENOUS AS NEEDED
Status: DISCONTINUED | OUTPATIENT
Start: 2025-07-04 | End: 2025-07-04 | Stop reason: SURG

## 2025-07-04 RX ORDER — KETOROLAC TROMETHAMINE 15 MG/ML
15 INJECTION, SOLUTION INTRAMUSCULAR; INTRAVENOUS EVERY 6 HOURS
Status: COMPLETED | OUTPATIENT
Start: 2025-07-05 | End: 2025-07-05

## 2025-07-04 RX ORDER — NALOXONE HCL 0.4 MG/ML
0.4 VIAL (ML) INJECTION
Status: ACTIVE | OUTPATIENT
Start: 2025-07-04 | End: 2025-07-05

## 2025-07-04 RX ORDER — ONDANSETRON 4 MG/1
4 TABLET, ORALLY DISINTEGRATING ORAL EVERY 8 HOURS PRN
Status: DISCONTINUED | OUTPATIENT
Start: 2025-07-04 | End: 2025-07-08 | Stop reason: HOSPADM

## 2025-07-04 RX ORDER — KETOROLAC TROMETHAMINE 30 MG/ML
INJECTION, SOLUTION INTRAMUSCULAR; INTRAVENOUS AS NEEDED
Status: DISCONTINUED | OUTPATIENT
Start: 2025-07-04 | End: 2025-07-04 | Stop reason: SURG

## 2025-07-04 RX ORDER — METOCLOPRAMIDE HYDROCHLORIDE 5 MG/ML
10 INJECTION INTRAMUSCULAR; INTRAVENOUS EVERY 4 HOURS PRN
Status: ACTIVE | OUTPATIENT
Start: 2025-07-04 | End: 2025-07-05

## 2025-07-04 RX ADMIN — FAMOTIDINE 20 MG: 10 INJECTION INTRAVENOUS at 20:16

## 2025-07-04 RX ADMIN — ACETAMINOPHEN 1000 MG: 500 TABLET, FILM COATED ORAL at 20:03

## 2025-07-04 RX ADMIN — SODIUM CITRATE AND CITRIC ACID MONOHYDRATE 15 ML: 500; 334 SOLUTION ORAL at 20:20

## 2025-07-04 RX ADMIN — OXYTOCIN 20 UNITS: 10 INJECTION INTRAVENOUS at 21:08

## 2025-07-04 RX ADMIN — DEXAMETHASONE SODIUM PHOSPHATE 4 MG: 4 INJECTION, SOLUTION INTRA-ARTICULAR; INTRALESIONAL; INTRAMUSCULAR; INTRAVENOUS; SOFT TISSUE at 21:38

## 2025-07-04 RX ADMIN — SODIUM CHLORIDE, POTASSIUM CHLORIDE, SODIUM LACTATE AND CALCIUM CHLORIDE 125 ML/HR: 600; 310; 30; 20 INJECTION, SOLUTION INTRAVENOUS at 20:02

## 2025-07-04 RX ADMIN — METOCLOPRAMIDE HYDROCHLORIDE 10 MG: 5 INJECTION INTRAMUSCULAR; INTRAVENOUS at 20:16

## 2025-07-04 RX ADMIN — KETOROLAC TROMETHAMINE 30 MG: 30 INJECTION INTRAMUSCULAR; INTRAVENOUS at 21:38

## 2025-07-04 RX ADMIN — CEFAZOLIN 2000 MG: 2 INJECTION, POWDER, FOR SOLUTION INTRAMUSCULAR; INTRAVENOUS at 20:17

## 2025-07-04 RX ADMIN — OXYTOCIN 20 UNITS: 10 INJECTION INTRAVENOUS at 21:38

## 2025-07-04 RX ADMIN — BUPIVACAINE HYDROCHLORIDE 1.6 MG: 7.5 INJECTION, SOLUTION EPIDURAL; RETROBULBAR at 20:46

## 2025-07-04 RX ADMIN — Medication 100 MCG: at 20:58

## 2025-07-04 RX ADMIN — EPHEDRINE SULFATE 25 MG: 50 INJECTION INTRAVENOUS at 21:57

## 2025-07-04 RX ADMIN — ONDANSETRON 8 MG: 2 INJECTION, SOLUTION INTRAMUSCULAR; INTRAVENOUS at 20:42

## 2025-07-04 RX ADMIN — Medication 100 MCG: at 20:51

## 2025-07-04 RX ADMIN — HYDROMORPHONE HYDROCHLORIDE 100 MCG: 1 INJECTION, SOLUTION INTRAMUSCULAR; INTRAVENOUS; SUBCUTANEOUS at 20:46

## 2025-07-04 RX ADMIN — MAGNESIUM SULFATE IN WATER 2 G/HR: 20 INJECTION, SOLUTION INTRAVENOUS at 22:11

## 2025-07-05 LAB
ALBUMIN SERPL-MCNC: 2.8 G/DL (ref 3.5–5.2)
ALBUMIN/GLOB SERPL: 1.1 G/DL
ALP SERPL-CCNC: 174 U/L (ref 39–117)
ALT SERPL W P-5'-P-CCNC: 13 U/L (ref 1–33)
AMPHET+METHAMPHET UR QL: NEGATIVE
AMPHETAMINES UR QL: NEGATIVE
ANION GAP SERPL CALCULATED.3IONS-SCNC: 9 MMOL/L (ref 5–15)
AST SERPL-CCNC: 22 U/L (ref 1–32)
BARBITURATES UR QL SCN: NEGATIVE
BASOPHILS # BLD AUTO: 0.02 10*3/MM3 (ref 0–0.2)
BASOPHILS NFR BLD AUTO: 0.2 % (ref 0–1.5)
BENZODIAZ UR QL SCN: NEGATIVE
BILIRUB SERPL-MCNC: 0.2 MG/DL (ref 0–1.2)
BUN SERPL-MCNC: 5.6 MG/DL (ref 6–20)
BUN/CREAT SERPL: 11.4 (ref 7–25)
BUPRENORPHINE SERPL-MCNC: NEGATIVE NG/ML
CALCIUM SPEC-SCNC: 7.7 MG/DL (ref 8.6–10.5)
CANNABINOIDS SERPL QL: NEGATIVE
CHLORIDE SERPL-SCNC: 101 MMOL/L (ref 98–107)
CO2 SERPL-SCNC: 20 MMOL/L (ref 22–29)
COCAINE UR QL: NEGATIVE
CREAT SERPL-MCNC: 0.49 MG/DL (ref 0.57–1)
DEPRECATED RDW RBC AUTO: 39.7 FL (ref 37–54)
EGFRCR SERPLBLD CKD-EPI 2021: 127 ML/MIN/1.73
EOSINOPHIL # BLD AUTO: 0 10*3/MM3 (ref 0–0.4)
EOSINOPHIL NFR BLD AUTO: 0 % (ref 0.3–6.2)
ERYTHROCYTE [DISTWIDTH] IN BLOOD BY AUTOMATED COUNT: 12.3 % (ref 12.3–15.4)
FENTANYL UR-MCNC: NEGATIVE NG/ML
GLOBULIN UR ELPH-MCNC: 2.5 GM/DL
GLUCOSE SERPL-MCNC: 104 MG/DL (ref 65–99)
HCT VFR BLD AUTO: 28 % (ref 34–46.6)
HGB BLD-MCNC: 9.2 G/DL (ref 12–15.9)
IMM GRANULOCYTES # BLD AUTO: 0.17 10*3/MM3 (ref 0–0.05)
IMM GRANULOCYTES NFR BLD AUTO: 1.6 % (ref 0–0.5)
LYMPHOCYTES # BLD AUTO: 0.88 10*3/MM3 (ref 0.7–3.1)
LYMPHOCYTES NFR BLD AUTO: 8.4 % (ref 19.6–45.3)
MCH RBC QN AUTO: 29.1 PG (ref 26.6–33)
MCHC RBC AUTO-ENTMCNC: 32.9 G/DL (ref 31.5–35.7)
MCV RBC AUTO: 88.6 FL (ref 79–97)
METHADONE UR QL SCN: NEGATIVE
MONOCYTES # BLD AUTO: 0.51 10*3/MM3 (ref 0.1–0.9)
MONOCYTES NFR BLD AUTO: 4.9 % (ref 5–12)
NEUTROPHILS NFR BLD AUTO: 8.86 10*3/MM3 (ref 1.7–7)
NEUTROPHILS NFR BLD AUTO: 84.9 % (ref 42.7–76)
OPIATES UR QL: NEGATIVE
OXYCODONE UR QL SCN: NEGATIVE
PCP UR QL SCN: NEGATIVE
PLATELET # BLD AUTO: 141 10*3/MM3 (ref 140–450)
PMV BLD AUTO: 11.8 FL (ref 6–12)
POTASSIUM SERPL-SCNC: 4.5 MMOL/L (ref 3.5–5.2)
PROT SERPL-MCNC: 5.3 G/DL (ref 6–8.5)
RBC # BLD AUTO: 3.16 10*6/MM3 (ref 3.77–5.28)
SODIUM SERPL-SCNC: 130 MMOL/L (ref 136–145)
TREPONEMA PALLIDUM IGG+IGM AB [PRESENCE] IN SERUM OR PLASMA BY IMMUNOASSAY: NORMAL
TRICYCLICS UR QL SCN: NEGATIVE
WBC NRBC COR # BLD AUTO: 10.44 10*3/MM3 (ref 3.4–10.8)

## 2025-07-05 PROCEDURE — 80307 DRUG TEST PRSMV CHEM ANLYZR: CPT | Performed by: OBSTETRICS & GYNECOLOGY

## 2025-07-05 PROCEDURE — 25010000002 MAGNESIUM SULFATE 20 GM/500ML SOLUTION: Performed by: OBSTETRICS & GYNECOLOGY

## 2025-07-05 PROCEDURE — 85025 COMPLETE CBC W/AUTO DIFF WBC: CPT | Performed by: OBSTETRICS & GYNECOLOGY

## 2025-07-05 PROCEDURE — 85461 HEMOGLOBIN FETAL: CPT | Performed by: OBSTETRICS & GYNECOLOGY

## 2025-07-05 PROCEDURE — 86900 BLOOD TYPING SEROLOGIC ABO: CPT | Performed by: OBSTETRICS & GYNECOLOGY

## 2025-07-05 PROCEDURE — 86901 BLOOD TYPING SEROLOGIC RH(D): CPT | Performed by: OBSTETRICS & GYNECOLOGY

## 2025-07-05 PROCEDURE — 25010000002 KETOROLAC TROMETHAMINE PER 15 MG: Performed by: OBSTETRICS & GYNECOLOGY

## 2025-07-05 PROCEDURE — 80053 COMPREHEN METABOLIC PANEL: CPT | Performed by: OBSTETRICS & GYNECOLOGY

## 2025-07-05 PROCEDURE — 25010000002 ENOXAPARIN PER 10 MG: Performed by: OBSTETRICS & GYNECOLOGY

## 2025-07-05 PROCEDURE — 0503F POSTPARTUM CARE VISIT: CPT | Performed by: OBSTETRICS & GYNECOLOGY

## 2025-07-05 RX ADMIN — KETOROLAC TROMETHAMINE 15 MG: 15 INJECTION INTRAMUSCULAR at 05:03

## 2025-07-05 RX ADMIN — KETOROLAC TROMETHAMINE 15 MG: 15 INJECTION INTRAMUSCULAR at 11:19

## 2025-07-05 RX ADMIN — SERTRALINE HYDROCHLORIDE 100 MG: 100 TABLET ORAL at 09:23

## 2025-07-05 RX ADMIN — MAGNESIUM SULFATE IN WATER 2 G/HR: 20 INJECTION, SOLUTION INTRAVENOUS at 10:14

## 2025-07-05 RX ADMIN — ENOXAPARIN SODIUM 40 MG: 100 INJECTION SUBCUTANEOUS at 22:04

## 2025-07-05 RX ADMIN — HYDROCODONE BITARTRATE AND ACETAMINOPHEN 1 TABLET: 10; 325 TABLET ORAL at 13:53

## 2025-07-05 RX ADMIN — KETOROLAC TROMETHAMINE 15 MG: 15 INJECTION INTRAMUSCULAR at 22:39

## 2025-07-05 RX ADMIN — KETOROLAC TROMETHAMINE 15 MG: 15 INJECTION INTRAMUSCULAR at 16:45

## 2025-07-05 RX ADMIN — ACETAMINOPHEN 1000 MG: 500 TABLET, FILM COATED ORAL at 08:21

## 2025-07-05 RX ADMIN — ACETAMINOPHEN 1000 MG: 500 TABLET, FILM COATED ORAL at 21:03

## 2025-07-05 RX ADMIN — ACETAMINOPHEN 1000 MG: 500 TABLET, FILM COATED ORAL at 02:01

## 2025-07-05 NOTE — OP NOTE
" Ana Yeung  : 1990  MRN: [id number]  CSN: [id number]     Section Operative Note    Preoperative Dx:   Intrauterine pregnancy at 37w0d weeks   severe preeclampsia  History of previous c/s X 2  Undesired fertility      Postoperative Dx:    Same as above     Procedure: Repeat low transverse -section (LTCS) with bilateral total salpingectomy       Surgeon: Ana Ortiz MD           Anesthesia: Spinal       EBL: 940 mls.   IV Fluids: 1000 mls.   UOP: clear.     Antibiotics: cefazolin 2 gms     Infant:           Gender: male infant    Weight: 3317 g (7 lb 5 oz)    Apgars: 7  @ 1 minute / 7  @ 5 minutes    Cord gases: Venous:No results found for: \"PHCVEN\", \"QME2DXQE\", \"TG4XBFS\", \"VOG8IMNL\", \"BECVEN\", \"I5BBVKMEE\"     Arterial:No results found for: \"PHCART\", \"AMN5BBOV\", \"OC9DZVV\", \"OHA6KDKC\", \"BECART\", \"R1MPNQVSJ\"     Procedure Details:   The patient was taken to the OR where she was prepped and draped in the usual sterile fashion in the dorsal supine position with a leftward lateral tilt.  Her previous Pfannenstiel incision was elliptically excised with a knife. The incision was carried down to the underlying fascia sharply.  The fascia was incised in the midline with the scalpel and this incision further developed using the fascial rip technique. The fascia was freed from its midline insertion superiorly and inferiorly. Rectus muscles were  in the midline. The peritoneum was entered bluntly and the peritoneal window further developed using blunt stretching.  A medium Protractor was placed to aid with visualization.  A bladder flap was not created sharply. The lower uterine segment was incised in a transverse fashion with the scalpel, and the uterine incision further developed with blunt stretching. Clear amniotic fluid was noted. The infant's head was delivered atraumatically. The mouth and nose were bulb suctioned.  The cord was clamped and cut after a 60 second " delay.  The infant was then handed off to waiting NICU staff.  The placenta was allowed to deliver spontaneously. The uterus was cleared of clot and debris with moist laparotomy sponges. The uterine incision was closed with #0 Vicryl in a continuous running locked fashion.  A second layer of #0 Vicryl in a running fashion was used to imbricate the first.  Upon careful inspection, the uterine incision was noted to be hemostatic.      The R fallopian tube was then grasped with a Pelican clamp and an opening created in the underlying mesosalpinx using the Bovie.  The entire length of the fallopian tube was isolated from the adjacent adnexa using 2-0 chromic ties.  Resulting pedicles were cauterized with the bovie.  Hemostasis was achieved without any further measures.  The L fallopian tube was then addressed in a similar fashion.  Both ovaries were noted to be normal.    The paracolic gutters were cleared of clot and debris with moist laparotomy sponges. The uterine incision was inspected one final time and found to be hemostatic.  Janessa hemostatic matrix was applied over the hysterotomy incision to prevent adhesion formation during healing. The fascia was reapproximated  with #0 Vicryl in a running fashion.  Pritesh's fascia was loosely reapproximated with 3-0 Vicryl and the skin was closed with 4-0 Monocryl using a subcuticular stitch. Skin glue was applied as a bandage.  All counts were correct X2.         Complications:   None      Disposition:   Mother to Mother Baby/Postpartum  in stable condition currently.   Baby to NICU for observation,  in stable condition currently.       Ana Ortiz MD  7/4/2025  21:46 CDT

## 2025-07-05 NOTE — H&P
[facility]  Sheila Yeung  : 1990  MRN: [id number]  [facility]: [id number]    History and Physical    Subjective   Sheila Yeung is a 34 y.o. year old  with an Estimated Date of Delivery: 25 scheduled for  delivery due  at 39 weeks due to previous  section X 2, not a  candidate.  She is planning for sterilization at the time of the .    The patient was sent to labor and delivery yesterday after her routine office visit.  She was later sent home because her blood pressures were not elevated.  When I saw the patient's protein to creatinine ratio today, I asked her to come back in so that she could be delivered due to atypical preeclampsia.  The patient has now had a headache for the past 2 weeks, intermittently associated with scotoma.  Additionally, she has had right upper quadrant pain for the past 1 to 2 weeks that is worsening and has had persistent emesis requiring Zofran for the past several days.    Her protein-to-creatinine ratio performed yesterday was over 400.  The patient's alkaline phosphatase and LDH are also both elevated.  The patient's pregnancy is also complicated by a history of eclamptic seizures at the time of the previous delivery.    Only, pregnancy also complicated by LGA infant.  Estimated fetal weight was 99% at 36 weeks, with an abdominal sono conference of 99.8%.    Lab Results   Component Value Date    GLUCOSE 77 2025    BUN 6.1 2025    CREATININE 0.46 (L) 2025     2025    K 4.2 2025     2025    CALCIUM 8.7 2025    PROTEINTOT 6.8 2025    ALBUMIN 3.6 2025    ALT 19 2025    AST 28 2025    ALKPHOS 226 (H) 2025    BILITOT 0.3 2025    GLOB 3.2 2025    AGRATIO 1.1 2025    BCR 13.3 2025    ANIONGAP 15.0 2025    EGFR 129.0 2025      WBC   Date Value Ref Range Status   2025 9.74 3.40 - 10.80 10*3/mm3 Final     RBC   Date  Value Ref Range Status   07/04/2025 3.69 (L) 3.77 - 5.28 10*6/mm3 Final     Hemoglobin   Date Value Ref Range Status   07/04/2025 10.7 (L) 12.0 - 15.9 g/dL Final     Hematocrit   Date Value Ref Range Status   07/04/2025 31.8 (L) 34.0 - 46.6 % Final     MCV   Date Value Ref Range Status   07/04/2025 86.2 79.0 - 97.0 fL Final     MCH   Date Value Ref Range Status   07/04/2025 29.0 26.6 - 33.0 pg Final     MCHC   Date Value Ref Range Status   07/04/2025 33.6 31.5 - 35.7 g/dL Final     RDW   Date Value Ref Range Status   07/04/2025 12.3 12.3 - 15.4 % Final     RDW-SD   Date Value Ref Range Status   07/04/2025 38.5 37.0 - 54.0 fl Final     MPV   Date Value Ref Range Status   07/04/2025 12.0 6.0 - 12.0 fL Final     Platelets   Date Value Ref Range Status   07/04/2025 162 140 - 450 10*3/mm3 Final     Neutrophil %   Date Value Ref Range Status   07/03/2025 71.7 42.7 - 76.0 % Final     Lymphocyte %   Date Value Ref Range Status   07/03/2025 16.7 (L) 19.6 - 45.3 % Final     Monocyte %   Date Value Ref Range Status   07/03/2025 6.9 5.0 - 12.0 % Final     Eosinophil %   Date Value Ref Range Status   07/03/2025 2.2 0.3 - 6.2 % Final     Basophil %   Date Value Ref Range Status   07/03/2025 0.4 0.0 - 1.5 % Final     Immature Grans %   Date Value Ref Range Status   07/03/2025 2.1 (H) 0.0 - 0.5 % Final     Neutrophils, Absolute   Date Value Ref Range Status   07/03/2025 5.95 1.70 - 7.00 10*3/mm3 Final     Lymphocytes, Absolute   Date Value Ref Range Status   07/03/2025 1.38 0.70 - 3.10 10*3/mm3 Final     Monocytes, Absolute   Date Value Ref Range Status   07/03/2025 0.57 0.10 - 0.90 10*3/mm3 Final     Eosinophils, Absolute   Date Value Ref Range Status   07/03/2025 0.18 0.00 - 0.40 10*3/mm3 Final     Basophils, Absolute   Date Value Ref Range Status   07/03/2025 0.03 0.00 - 0.20 10*3/mm3 Final     Immature Grans, Absolute   Date Value Ref Range Status   07/03/2025 0.17 (H) 0.00 - 0.05 10*3/mm3 Final     nRBC   Date Value Ref Range  Status   2025 0.0 0.0 - 0.2 /100 WBC Final      LDH   Date Value Ref Range Status   2025 269 (H) 135 - 214 U/L Final     Uric Acid   Date Value Ref Range Status   2025 4.1 2.4 - 5.7 mg/dL Final        Prenatal care has been with Dr. Ana Ortiz.    OB History    Para Term  AB Living   3 2 2 0 0 2   SAB IAB Ectopic Molar Multiple Live Births   0 0 0 0 0 2      # Outcome Date GA Lbr Abimael/2nd Weight Sex Type Anes PTL Lv   3 Current            2 Term 17 38w0d  3070 g (6 lb 12.3 oz) F CS-LTranv Spinal N ZACHERY      Name: PITO CARVER      Apgar1: 8  Apgar5: 9   1 Term 14 38w3d   F CS-LTranv EPI N ZACHERY      Complications: Fetal Intolerance     Past Medical History:   Diagnosis Date    Abnormal cervical Papanicolaou smear 2021    Depression     Postpartum     Gestational hypertension     Herpes     HSV 1 +     Past Surgical History:   Procedure Laterality Date     SECTION           SECTION Bilateral 2017    Procedure:  SECTION REPEAT;  Surgeon: Brittaney Sheppard MD;  Location: [facility] LABOR DELIVERY;  Service:     CHOLECYSTECTOMY N/A 10/27/2017    Procedure: CHOLECYSTECTOMY LAPAROSCOPIC;  Surgeon: Carolee Sanchez MD;  Location: [facility] OR;  Service:        Current Facility-Administered Medications:     ceFAZolin 2000 mg IVPB in 100 mL NS (MBP), 2,000 mg, Intravenous, Once, Ana Ortiz MD, 2,000 mg at 25    lactated ringers bolus 1,000 mL, 1,000 mL, Intravenous, Once, Ana Ortiz MD    lactated ringers infusion, 125 mL/hr, Intravenous, Continuous, Ana Ortiz MD, Last Rate: 125 mL/hr at 25, 125 mL/hr at 25    ondansetron (ZOFRAN) injection 4 mg, 4 mg, Intravenous, Once PRN, Tanner Georges CRNA    sodium chloride 0.9 % flush 10 mL, 10 mL, Intravenous, Q12H, Ana Ortiz MD    sodium chloride 0.9 % flush 10 mL, 10 mL, Intravenous, PRN, Ana Ortiz MD    sodium chloride 0.9  "% infusion 40 mL, 40 mL, Intravenous, PRN, Ana Ortiz MD  Family History   Problem Relation Age of Onset    Multiple sclerosis Father     Hypertension Paternal Grandmother     Hypertension Maternal Grandmother     Diabetes Maternal Grandfather        Allergies   Allergen Reactions    Percocet [Oxycodone-Acetaminophen] Palpitations     Social History    Tobacco Use      Smoking status: Former        Packs/day: 0.00        Types: Cigarettes        Quit date:         Years since quittin.5      Smokeless tobacco: Never    Review of Systems   Constitutional:  Negative for activity change.   Eyes:  Positive for visual disturbance (intermittently).   Respiratory:  Negative for shortness of breath.    Cardiovascular:  Negative for chest pain.   Gastrointestinal:  Positive for abdominal pain (RUQ), nausea and vomiting.   Genitourinary:  Positive for pelvic pain (pressure). Negative for vaginal bleeding and vaginal discharge.   Neurological:  Positive for headaches. Negative for dizziness and seizures.         Objective   Ht 158.8 cm (62.5\")   Wt 95.3 kg (210 lb)   Breastfeeding Yes   BMI 37.80 kg/m²   General: well developed; well nourished  no acute distress  mentation appropriate   Heart: Not performed.   Lungs: breathing is unlabored   Abdomen: Fundus firm, appropriate.  + mild tenderness across upper abdomen     Cervix:   not examined    Prenatal Labs  Lab Results   Component Value Date    HGB 10.7 (L) 2025    HEPBSAG Negative 2024    ABORH A Rh Negative 2014    ABO A 2024    RH Negative 2024    ABSCRN Negative 2025    VMG1COP9 Non Reactive 2024    HEPCVIRUSABY Negative 10/26/2017    URINECX Final report 2024       Recent Labs  Lab Results   Component Value Date    HGB 10.7 (L) 2025    HCT 31.8 (L) 2025    WBC 9.74 2025     2025           Assessment   IUP with an Estimated Date of Delivery: 25  2.   Repeat C/S with " bilateral salpingectomy for previous  section X 2, not a  candidate and severe preeclmapsia       Plan   Repeat  with bilateral salpingectomy  Patient has no questions or concerns about scheduled procedure.  She again confirms that she is certain about the salpingectomy, and understands it to be permanent    Ana Ortiz MD  2025

## 2025-07-05 NOTE — ANESTHESIA POSTPROCEDURE EVALUATION
Patient: Sheila Yeung    Procedure Summary       Date: 25 Room / Location: [facility] LABOR DELIVERY 2 / [facility] LABOR DELIVERY    Anesthesia Start:  Anesthesia Stop:     Procedure:  SECTION REPEAT WITH SALPINGECTOMY (Bilateral) Diagnosis:       Previous  section      (Previous  section [Z98.891])    Surgeons: Ana Ortiz MD Provider: Tanner Georges CRNA    Anesthesia Type: spinal ASA Status: 2            Anesthesia Type: spinal    Vitals  Vitals Value Taken Time   /75 25 07:01   Temp 98.1 °F (36.7 °C) 25 04:00   Pulse 100 25 07:01   Resp 16 25 07:00   SpO2 93 % 25 03:00           Post Anesthesia Care and Evaluation    Patient location during evaluation: PACU  Patient participation: complete - patient participated  Level of consciousness: awake and awake and alert  Pain score: 0  Pain management: adequate    Airway patency: patent  Anesthetic complications: No anesthetic complications    Cardiovascular status: acceptable and stable  Respiratory status: acceptable and unassisted  Hydration status: acceptable  Post Neuraxial Block status: Motor and sensory function returned to baseline and No signs or symptoms of PDPH

## 2025-07-05 NOTE — PROGRESS NOTES
PAMELLA Perez   PROGRESS NOTE    Post-Op Day 1 S/P   Subjective     Patient reports:  Pain is well controlled with acetaminophen, Toradol, and abdominal binder.  She is not ambulating at all since she has remained on L&D for magnesium sulfate. Tolerating regular diet.  Voiding - still has guzman catheter; flatus not yet reported.  Vaginal bleeding is light.     Breastfeeding: has not attempted yet because baby is in the NICU      Objective      Vitals: Vital Signs Range for the last 24 hours  Temperature: Temp:  [97.6 °F (36.4 °C)-98.1 °F (36.7 °C)] 98.1 °F (36.7 °C)   Temp Source: Temp src: Oral   BP: BP: ()/(42-80) 141/75   Pulse: Heart Rate:  [] 100   Respirations: Resp:  [16-18] 16   SPO2: SpO2:  [93 %-100 %] 93 %   O2 Amount (l/min):     O2 Devices Device (Oxygen Therapy): room air   Weight: Weight:  [95.3 kg (210 lb)] 95.3 kg (210 lb)            Physical Exam    Lungs breathing is unlabored   Abdomen Abnormal shape: normal   Incision  Not examined.  Binder not removed since patient sitting upright in bed having breakfast   Extremities extremities normal, atraumatic, no cyanosis or edema.  SCD's in place     I reviewed the patient's new clinical results.  Lab Results (last 24 hours)       Procedure Component Value Units Date/Time    Comprehensive Metabolic Panel [[id number]]  (Abnormal) Collected: 25    Specimen: Blood Updated: 25     Glucose 104 mg/dL      BUN 5.6 mg/dL      Creatinine 0.49 mg/dL      Sodium 130 mmol/L      Potassium 4.5 mmol/L      Chloride 101 mmol/L      CO2 20.0 mmol/L      Calcium 7.7 mg/dL      Total Protein 5.3 g/dL      Albumin 2.8 g/dL      ALT (SGPT) 13 U/L      AST (SGOT) 22 U/L      Alkaline Phosphatase 174 U/L      Total Bilirubin 0.2 mg/dL      Globulin 2.5 gm/dL      A/G Ratio 1.1 g/dL      BUN/Creatinine Ratio 11.4     Anion Gap 9.0 mmol/L      eGFR 127.0 mL/min/1.73     Narrative:      GFR Categories in Chronic Kidney Disease (CKD)               GFR Category          GFR (mL/min/1.73)    Interpretation  G1                    90 or greater        Normal or high (1)  G2                    60-89                Mild decrease (1)  G3a                   45-59                Mild to moderate decrease  G3b                   30-44                Moderate to severe decrease  G4                    15-29                Severe decrease  G5                    14 or less           Kidney failure    (1)In the absence of evidence of kidney disease, neither GFR category G1 or G2 fulfill the criteria for CKD.    eGFR calculation 2021 CKD-EPI creatinine equation, which does not include race as a factor    CBC & Differential [[id number]]  (Abnormal) Collected: 07/05/25 0617    Specimen: Blood Updated: 07/05/25 0636    Narrative:      The following orders were created for panel order CBC & Differential.  Procedure                               Abnormality         Status                     ---------                               -----------         ------                     CBC Auto Differential[[id number]]        Abnormal            Final result                 Please view results for these tests on the individual orders.    CBC Auto Differential [[id number]]  (Abnormal) Collected: 07/05/25 0617    Specimen: Blood Updated: 07/05/25 0636     WBC 10.44 10*3/mm3      RBC 3.16 10*6/mm3      Hemoglobin 9.2 g/dL      Hematocrit 28.0 %      MCV 88.6 fL      MCH 29.1 pg      MCHC 32.9 g/dL      RDW 12.3 %      RDW-SD 39.7 fl      MPV 11.8 fL      Platelets 141 10*3/mm3      Neutrophil % 84.9 %      Lymphocyte % 8.4 %      Monocyte % 4.9 %      Eosinophil % 0.0 %      Basophil % 0.2 %      Immature Grans % 1.6 %      Neutrophils, Absolute 8.86 10*3/mm3      Lymphocytes, Absolute 0.88 10*3/mm3      Monocytes, Absolute 0.51 10*3/mm3      Eosinophils, Absolute 0.00 10*3/mm3      Basophils, Absolute 0.02 10*3/mm3      Immature Grans, Absolute 0.17 10*3/mm3     CBC (No Diff)  [[id number]]  (Abnormal) Collected: 25    Specimen: Blood Updated: 25     WBC 9.74 10*3/mm3      RBC 3.69 10*6/mm3      Hemoglobin 10.7 g/dL      Hematocrit 31.8 %      MCV 86.2 fL      MCH 29.0 pg      MCHC 33.6 g/dL      RDW 12.3 %      RDW-SD 38.5 fl      MPV 12.0 fL      Platelets 162 10*3/mm3     Treponema pallidum AB w/Reflex RPR [[id number]] Collected: 25    Specimen: Blood Updated: 25            Assessment & Plan        * No active hospital problems. *      Assessment:    Sheila Yeung is Day 1  post-partum  , Low Transverse  .  Patient delivered delivery for magnesium sulfate, due to severe preeclampsia         Plan:  continue post op care.  Continue magnesium sulfate 24 hours after delivery.  Patient has  had good urine output        Ana Ortiz MD  2025  08:36 CDT

## 2025-07-05 NOTE — LACTATION NOTE
"This note was copied from a baby's chart.  Mother's Name: Sheila \"Mando\"  Infant's Name: Sanjay  Day: 0  Dx: TTN  Birth Gestation: 37w0d  Adjusted Gestation:  Birth weight: 7-5 (3317 g)  Last weight:              % of weight loss:    Feeding Orders: 10 ml EBM or DBM every 3 hours per OG tube  Maternal Hx: , BF 2 previous children for 1 year each, Depression, GHTN HSV 1, +THC on prenatal negative on admission.    Prenatal Medications: Aspirin, Zoloft, PNV, Zofran   Pump available: yes, Spectra and wearable medela for home. Using hospital pump now.   Pumping history in the last 24 hours: began pumping 11 hours after delivery, pumping every 3 hours now and first collection was 10 ml and now drops during each pump.       Visited with mother in LDR room, mother is currently on mag at this time and is pumping. Mother currently pumping on highest suction setting. Educated mother to pump at setting that is most comfortable, mother stated she turned this up because she has not been able to get more than drops when pumping since this morning. Educated mother that this is normal and to continue pumping every 2-3 hours. Educated mother on power pumping and encouraged to power pump to provide extra stimulation due to delay. Mother verbalized understanding. Green NICU folder provided and reviewed. Educated mother that goal is to transport labeled colostrum/EBM to NICU within 1 hour of pumping. Educated to wash pump parts after each pump session and to use steam bag once a day. Encouraged to bring flanges if only drops are collected to provide to infant for oral care. Mother reports that she has been able to hold infant skin to skin today, much praise provided for this! Encouraged to pump after STS. Mother verbalized understanding. Much support and encouragement provided.     **Breast pump Kit Care Cleaning-Drying-Storing handout by [employer]   **Collecting,Labeling,Storing and Transporting Breast milk for Babies in the " NICU handout  **Hand Expression handout provided by [employer]   **Breast Engorgement Handout provided by [employer]   **Check list for Essentials of Positioning and Latch-on  Handout provided by [employer]  **The Many Benefits of Breastfeeding handout  **Hands on Pumping Handout provided by [employer]  **Increasing breast milk supply for a Baby in the NICU handout provided by [employer]  **My Breast pumping Log Handout  **When Will my Milk Come In/ handout provided  By the first latch  **Human Milk Storage for  Infants handout  **Care Plan for Breastfeeding your  Baby handout  **Using a Nipple Shield handout provided by [employer]  **Providing Milk for Your NICU Baby Handout  **Quick Clean Micro Steam bags by [employer]  ** Freshly Expressed Breastmilk Storage Guidelines for Healthy Term Babies Magnet by [employer]    Providing Milk for your NICU Baby  The following is a list of what to expect after the birth of your baby and how a mother's milk can make all the difference.    THE BENEFITS OF MOTHER'S OWN MILK FOR A NICU BABY  * In addition to all the benefits breast milk provides all babies (see list on other handout), NICU babies receive extra benefits.  * Your milk is easier on baby's tummy which may be less ready to digest food.  *  milk has more beneficial fat for growth, protein, and essential minerals.  * Mother's milk allows for better eye development and function  *Babies born early must finish their body tissue development after birth. The special protein in mother's milk allows for optimal body development  *Mother's milk is a natural antibiotic that protects baby from infections; babies fed breast milk are much less likely to develop gastrointestinal illnesses, including the dangerous infection necrotizing enterocolitis (NEC)  *Digesting breast milk is much easier for the baby.  He is them able to use the saad calories he takes in for growth and development. It provides a fast fuel that is easily absorbed for use in critical body functions.    By providing human milk for your baby, you are providing a powerful medication that no hospital can make!!    HOW TO BEGIN:  *If you feel up to it, our lactation staff will help you begin pumping your milk as soon as possible after delivery. Our goal is to begin within 4 hours of baby's birth. If this is not possible, we must make every effort to institute pumping within 12 hours of delivery.    *Do not be discouraged by small amounts! This is super-concentrated nutrition and all baby needs is in those magical drops. In your folder there is a guide regarding target amounts and a pumping log to record your efforts at providing milk for your baby.

## 2025-07-05 NOTE — ANESTHESIA PROCEDURE NOTES
Intrathecal Block      Patient reassessed immediately prior to procedure    Patient location during procedure: OB  Performed By  CRNA/AYLIN: Tanner Georges CRNA  Preanesthetic Checklist  Completed: patient identified, site marked, surgical consent, pre-op evaluation, timeout performed, IV checked, risks and benefits discussed and monitors and equipment checked  Intrathecal Block Prep:  Pt Position:sitting  Sterile Tech:sterile barrier, gloves and cap  Prep:chloraprep  Monitoring:blood pressure monitoring, continuous pulse oximetry and EKG  Intrathecal Block Procedure:  Approach:midline  Location:L3-L4  Needle Type:Sprotte  Needle Gauge:25 G  Placement of Needle Event: cerebrospinal fluid  Fluid Appearance:clear  Post Assessment:  Patient Tolerance:patient tolerated the procedure well with no apparent complications  Complications:no

## 2025-07-05 NOTE — ANESTHESIA PREPROCEDURE EVALUATION
Anesthesia Evaluation     NPO Solid Status: > 8 hours  NPO Liquid Status: > 4 hours           Airway   Dental      Pulmonary    Cardiovascular     (+) hypertension      Neuro/Psych  GI/Hepatic/Renal/Endo      Musculoskeletal     Abdominal    Substance History      OB/GYN    (+) Pregnant, pregnancy induced hypertension        Other                    Anesthesia Plan    ASA 2     spinal       Anesthetic plan, risks, benefits, and alternatives have been provided, discussed and informed consent has been obtained with: patient.    CODE STATUS:    Code Status (Patient has no pulse and is not breathing): CPR (Attempt to Resuscitate)  Medical Interventions (Patient has pulse or is breathing): Full Support  Level Of Support Discussed With: Patient

## 2025-07-05 NOTE — PLAN OF CARE
Goal Outcome Evaluation:           Progress: improving  Outcome Evaluation:  37w0d. Delivered  at 2105 via repeat c/s for preecclampsia. Magnesium currently infusing- plan to be turned off tonight at 2211. No current signs of mag toxicity. Brisk reflexes, adequate urine output. ERAS protocol in place. Pain controlled with medications. FF, 1 below, scant lochia. Frequent visits to the NICU for skin to skin with baby encouraged. Breastfeeding. VSS.

## 2025-07-06 LAB
ABO GROUP BLD: NORMAL
BLD GP AB SCN SERPL QL: NEGATIVE
FETAL BLEED: NEGATIVE
NUMBER OF DOSES: NORMAL
RH BLD: NEGATIVE

## 2025-07-06 PROCEDURE — 25010000002 RHO D IMMUNE GLOBULIN 1500 UNIT/2ML SOLUTION PREFILLED SYRINGE: Performed by: OBSTETRICS & GYNECOLOGY

## 2025-07-06 PROCEDURE — 86850 RBC ANTIBODY SCREEN: CPT

## 2025-07-06 PROCEDURE — 0503F POSTPARTUM CARE VISIT: CPT | Performed by: OBSTETRICS & GYNECOLOGY

## 2025-07-06 PROCEDURE — 25010000002 ENOXAPARIN PER 10 MG: Performed by: OBSTETRICS & GYNECOLOGY

## 2025-07-06 RX ORDER — HYDROMORPHONE HYDROCHLORIDE 2 MG/1
4 TABLET ORAL
Refills: 0 | Status: DISCONTINUED | OUTPATIENT
Start: 2025-07-06 | End: 2025-07-08 | Stop reason: HOSPADM

## 2025-07-06 RX ADMIN — ENOXAPARIN SODIUM 40 MG: 100 INJECTION SUBCUTANEOUS at 19:59

## 2025-07-06 RX ADMIN — IBUPROFEN 600 MG: 600 TABLET ORAL at 18:20

## 2025-07-06 RX ADMIN — PRENATAL VITAMINS-IRON FUMARATE 27 MG IRON-FOLIC ACID 0.8 MG TABLET 1 TABLET: at 09:07

## 2025-07-06 RX ADMIN — HYDROCODONE BITARTRATE AND ACETAMINOPHEN 1 TABLET: 10; 325 TABLET ORAL at 19:54

## 2025-07-06 RX ADMIN — ACETAMINOPHEN 650 MG: 325 TABLET ORAL at 19:54

## 2025-07-06 RX ADMIN — HYDROMORPHONE HYDROCHLORIDE 4 MG: 2 TABLET ORAL at 14:47

## 2025-07-06 RX ADMIN — SERTRALINE HYDROCHLORIDE 100 MG: 100 TABLET ORAL at 09:08

## 2025-07-06 RX ADMIN — IBUPROFEN 600 MG: 600 TABLET ORAL at 04:49

## 2025-07-06 RX ADMIN — ACETAMINOPHEN 650 MG: 325 TABLET ORAL at 09:07

## 2025-07-06 RX ADMIN — HYDROMORPHONE HYDROCHLORIDE 4 MG: 2 TABLET ORAL at 04:33

## 2025-07-06 RX ADMIN — ACETAMINOPHEN 650 MG: 325 TABLET ORAL at 14:47

## 2025-07-06 RX ADMIN — HYDROMORPHONE HYDROCHLORIDE 4 MG: 2 TABLET ORAL at 11:13

## 2025-07-06 RX ADMIN — IBUPROFEN 600 MG: 600 TABLET ORAL at 11:07

## 2025-07-06 RX ADMIN — IBUPROFEN 600 MG: 600 TABLET ORAL at 23:54

## 2025-07-06 RX ADMIN — HUMAN RHO(D) IMMUNE GLOBULIN 1500 UNITS: 1500 SOLUTION INTRAMUSCULAR; INTRAVENOUS at 11:55

## 2025-07-06 RX ADMIN — ACETAMINOPHEN 650 MG: 325 TABLET ORAL at 02:02

## 2025-07-06 NOTE — PLAN OF CARE
Goal Outcome Evaluation:  Plan of Care Reviewed With: patient, spouse        Progress: improving  Outcome Evaluation: VSS. FFMLU1, scant bleeding. voiding and ambulating. taking eras and prn pain medications. pumping. viits infant in nicu

## 2025-07-06 NOTE — PROGRESS NOTES
[facility]   PROGRESS NOTE    Post-Op Day 2 S/P   Subjective     Patient reports:  Pain is well controlled with acetaminophen, ibuprofen (OTC), abdominal binder, and dilaudid.  Patient is allergic to oxycodone; she had Norco ordered for pain, but it could not be administered because the nurse had just given her Tylenol.  It is for this reason that the patient was switched to Dilaudid.  She is ambulating without assistance. Tolerating regular diet.  Voiding - without difficulty; flatus without difficulty.  Vaginal bleeding is light.     Breastfeeding: baby is in the NICU, but patient is breast-feeding      Objective      Vitals: Vital Signs Range for the last 24 hours  Temperature: Temp:  [97.3 °F (36.3 °C)-98.2 °F (36.8 °C)] 98 °F (36.7 °C)   Temp Source: Temp src: Oral   BP: BP: (112-141)/(64-98) 131/71   Pulse: Heart Rate:  [] 89   Respirations: Resp:  [16-18] 16   SPO2: SpO2:  [94 %-100 %] 99 %   O2 Amount (l/min):     O2 Devices Device (Oxygen Therapy): room air   Weight:              Physical Exam    Lungs breathing is unlabored   Abdomen Abnormal shape: normal   Incision  Incision healing well, clean/dry/intact   Extremities extremities normal, atraumatic, no cyanosis or edema.      I reviewed the patient's new clinical results.  Lab Results (last 24 hours)       Procedure Component Value Units Date/Time    Fentanyl, Urine - Urine, Clean Catch [[id number]]  (Normal) Collected: 25 1649    Specimen: Urine, Clean Catch Updated: 25 1713     Fentanyl, Urine Negative    Narrative:      Negative Threshold:      Fentanyl 5 ng/mL     The normal value for the drug tested is negative. This report includes final unconfirmed screening results to be used for medical treatment purposes only. Unconfirmed results must not be used for non-medical purposes such as employment or legal testing. Clinical consideration should be applied to any drug of abuse test, particularly when unconfirmed  results are used.           Urine Drug Screen - Urine, Clean Catch [[id number]]  (Normal) Collected: 07/05/25 1649    Specimen: Urine, Clean Catch Updated: 07/05/25 1712     THC, Screen, Urine Negative     Phencyclidine (PCP), Urine Negative     Cocaine Screen, Urine Negative     Methamphetamine, Ur Negative     Opiate Screen Negative     Amphetamine Screen, Urine Negative     Benzodiazepine Screen, Urine Negative     Tricyclic Antidepressants Screen Negative     Methadone Screen, Urine Negative     Barbiturates Screen, Urine Negative     Oxycodone Screen, Urine Negative     Buprenorphine, Screen, Urine Negative    Narrative:      Cutoff For Drugs Screened:    Amphetamines               500 ng/ml  Barbiturates               200 ng/ml  Benzodiazepines            150 ng/ml  Cocaine                    150 ng/ml  Methadone                  200 ng/ml  Opiates                    100 ng/ml  Phencyclidine               25 ng/ml  THC                         50 ng/ml  Methamphetamine            500 ng/ml  Tricyclic Antidepressants  300 ng/ml  Oxycodone                  100 ng/ml  Buprenorphine               10 ng/ml    The normal value for all drugs tested is negative. This report includes unconfirmed screening results, with the cutoff values listed, to be used for medical treatment purposes only.  Unconfirmed results must not be used for non-medical purposes such as employment or legal testing.  Clinical consideration should be applied to any drug of abuse test, particularly when unconfirmed results are used.      Treponema pallidum AB w/Reflex RPR [[id number]]  (Normal) Collected: 07/04/25 2002    Specimen: Blood Updated: 07/05/25 1159     Treponemal AB Total Non-Reactive    Narrative:      Reactive results will reflex RPR testing.    Comprehensive Metabolic Panel [[id number]]  (Abnormal) Collected: 07/05/25 0617    Specimen: Blood Updated: 07/05/25 0648     Glucose 104 mg/dL      BUN 5.6 mg/dL      Creatinine 0.49 mg/dL       Sodium 130 mmol/L      Potassium 4.5 mmol/L      Chloride 101 mmol/L      CO2 20.0 mmol/L      Calcium 7.7 mg/dL      Total Protein 5.3 g/dL      Albumin 2.8 g/dL      ALT (SGPT) 13 U/L      AST (SGOT) 22 U/L      Alkaline Phosphatase 174 U/L      Total Bilirubin 0.2 mg/dL      Globulin 2.5 gm/dL      A/G Ratio 1.1 g/dL      BUN/Creatinine Ratio 11.4     Anion Gap 9.0 mmol/L      eGFR 127.0 mL/min/1.73     Narrative:      GFR Categories in Chronic Kidney Disease (CKD)              GFR Category          GFR (mL/min/1.73)    Interpretation  G1                    90 or greater        Normal or high (1)  G2                    60-89                Mild decrease (1)  G3a                   45-59                Mild to moderate decrease  G3b                   30-44                Moderate to severe decrease  G4                    15-29                Severe decrease  G5                    14 or less           Kidney failure    (1)In the absence of evidence of kidney disease, neither GFR category G1 or G2 fulfill the criteria for CKD.    eGFR calculation 2021 CKD-EPI creatinine equation, which does not include race as a factor    CBC & Differential [[id number]]  (Abnormal) Collected: 07/05/25 0617    Specimen: Blood Updated: 07/05/25 0636    Narrative:      The following orders were created for panel order CBC & Differential.  Procedure                               Abnormality         Status                     ---------                               -----------         ------                     CBC Auto Differential[[id number]]        Abnormal            Final result                 Please view results for these tests on the individual orders.    CBC Auto Differential [[id number]]  (Abnormal) Collected: 07/05/25 0617    Specimen: Blood Updated: 07/05/25 0636     WBC 10.44 10*3/mm3      RBC 3.16 10*6/mm3      Hemoglobin 9.2 g/dL      Hematocrit 28.0 %      MCV 88.6 fL      MCH 29.1 pg      MCHC 32.9 g/dL      RDW 12.3 %       RDW-SD 39.7 fl      MPV 11.8 fL      Platelets 141 10*3/mm3      Neutrophil % 84.9 %      Lymphocyte % 8.4 %      Monocyte % 4.9 %      Eosinophil % 0.0 %      Basophil % 0.2 %      Immature Grans % 1.6 %      Neutrophils, Absolute 8.86 10*3/mm3      Lymphocytes, Absolute 0.88 10*3/mm3      Monocytes, Absolute 0.51 10*3/mm3      Eosinophils, Absolute 0.00 10*3/mm3      Basophils, Absolute 0.02 10*3/mm3      Immature Grans, Absolute 0.17 10*3/mm3             Assessment & Plan        * No active hospital problems. *      Assessment:    Sheila Yeung is Day 2  post-partum  , Low Transverse  .  Patient s/p 24 hours of magnesium sulfate, due to severe preeclampsia         Plan:  continue post op care.  Patient will remain admitted to hospital for another day and then possibly transition to hospitality room since baby is currently in the NICU.       Ana Ortiz MD  2025  06:11 CDT

## 2025-07-06 NOTE — PLAN OF CARE
Goal Outcome Evaluation:           Progress: improving   37w0d repeat c/s for preecclampsia.   VSS, FF,midline, 1 below, scant bleeding. Up ad mateus. On ERAS and PRN medication for pain control. Frequent visits to the NICU visiting baby.

## 2025-07-06 NOTE — CASE MANAGEMENT/SOCIAL WORK
Order: EPDS 11 . Pt lives at home with spouse and 2 other children ages 7&11.  She stated depression she feels due to hormones with pregnancy and with  being in NICU.  She stated being on medication and aware to contact physician if condition does not improve or worsens.

## 2025-07-06 NOTE — NURSING NOTE
RN removed IV due to being infiltrated. RN expressed importance of needing a new iv. Patient refused new iv.

## 2025-07-07 ENCOUNTER — PATIENT OUTREACH (OUTPATIENT)
Dept: LABOR AND DELIVERY | Facility: HOSPITAL | Age: 35
End: 2025-07-07
Payer: COMMERCIAL

## 2025-07-07 LAB
ALBUMIN SERPL-MCNC: 3 G/DL (ref 3.5–5.2)
ALBUMIN/GLOB SERPL: 1.1 G/DL
ALP SERPL-CCNC: 150 U/L (ref 39–117)
ALT SERPL W P-5'-P-CCNC: 16 U/L (ref 1–33)
ANION GAP SERPL CALCULATED.3IONS-SCNC: 10 MMOL/L (ref 5–15)
AST SERPL-CCNC: 29 U/L (ref 1–32)
BILIRUB SERPL-MCNC: <0.2 MG/DL (ref 0–1.2)
BUN SERPL-MCNC: 6.6 MG/DL (ref 6–20)
BUN/CREAT SERPL: 14.3 (ref 7–25)
CALCIUM SPEC-SCNC: 8.6 MG/DL (ref 8.6–10.5)
CHLORIDE SERPL-SCNC: 105 MMOL/L (ref 98–107)
CO2 SERPL-SCNC: 23 MMOL/L (ref 22–29)
CREAT SERPL-MCNC: 0.46 MG/DL (ref 0.57–1)
DEPRECATED RDW RBC AUTO: 41.8 FL (ref 37–54)
EGFRCR SERPLBLD CKD-EPI 2021: 129 ML/MIN/1.73
ERYTHROCYTE [DISTWIDTH] IN BLOOD BY AUTOMATED COUNT: 12.9 % (ref 12.3–15.4)
FERRITIN SERPL-MCNC: 31.49 NG/ML (ref 13–150)
GLOBULIN UR ELPH-MCNC: 2.8 GM/DL
GLUCOSE SERPL-MCNC: 78 MG/DL (ref 65–99)
HCT VFR BLD AUTO: 26.5 % (ref 34–46.6)
HGB BLD-MCNC: 8.6 G/DL (ref 12–15.9)
IRON 24H UR-MRATE: 254 MCG/DL (ref 37–145)
IRON SATN MFR SERPL: 43 % (ref 20–50)
LDH SERPL-CCNC: 313 U/L (ref 135–214)
MCH RBC QN AUTO: 29.2 PG (ref 26.6–33)
MCHC RBC AUTO-ENTMCNC: 32.5 G/DL (ref 31.5–35.7)
MCV RBC AUTO: 89.8 FL (ref 79–97)
PLATELET # BLD AUTO: 182 10*3/MM3 (ref 140–450)
PMV BLD AUTO: 11.5 FL (ref 6–12)
POTASSIUM SERPL-SCNC: 4.1 MMOL/L (ref 3.5–5.2)
PROT SERPL-MCNC: 5.8 G/DL (ref 6–8.5)
RBC # BLD AUTO: 2.95 10*6/MM3 (ref 3.77–5.28)
SODIUM SERPL-SCNC: 138 MMOL/L (ref 136–145)
TIBC SERPL-MCNC: 592 MCG/DL (ref 298–536)
TRANSFERRIN SERPL-MCNC: 397 MG/DL (ref 200–360)
URATE SERPL-MCNC: 3.9 MG/DL (ref 2.4–5.7)
WBC NRBC COR # BLD AUTO: 7.04 10*3/MM3 (ref 3.4–10.8)

## 2025-07-07 PROCEDURE — 80053 COMPREHEN METABOLIC PANEL: CPT | Performed by: OBSTETRICS & GYNECOLOGY

## 2025-07-07 PROCEDURE — 0503F POSTPARTUM CARE VISIT: CPT | Performed by: OBSTETRICS & GYNECOLOGY

## 2025-07-07 PROCEDURE — 84550 ASSAY OF BLOOD/URIC ACID: CPT | Performed by: OBSTETRICS & GYNECOLOGY

## 2025-07-07 PROCEDURE — 85027 COMPLETE CBC AUTOMATED: CPT | Performed by: OBSTETRICS & GYNECOLOGY

## 2025-07-07 PROCEDURE — 84466 ASSAY OF TRANSFERRIN: CPT | Performed by: OBSTETRICS & GYNECOLOGY

## 2025-07-07 PROCEDURE — 25010000002 ENOXAPARIN PER 10 MG: Performed by: OBSTETRICS & GYNECOLOGY

## 2025-07-07 PROCEDURE — 83615 LACTATE (LD) (LDH) ENZYME: CPT | Performed by: OBSTETRICS & GYNECOLOGY

## 2025-07-07 PROCEDURE — 83540 ASSAY OF IRON: CPT | Performed by: OBSTETRICS & GYNECOLOGY

## 2025-07-07 PROCEDURE — 82728 ASSAY OF FERRITIN: CPT | Performed by: OBSTETRICS & GYNECOLOGY

## 2025-07-07 RX ORDER — ONDANSETRON 4 MG/1
4 TABLET, FILM COATED ORAL DAILY PRN
Qty: 30 TABLET | Refills: 3 | OUTPATIENT
Start: 2025-07-07 | End: 2026-07-07

## 2025-07-07 RX ORDER — FERROUS SULFATE 325(65) MG
325 TABLET ORAL 2 TIMES DAILY WITH MEALS
Status: DISCONTINUED | OUTPATIENT
Start: 2025-07-07 | End: 2025-07-08 | Stop reason: HOSPADM

## 2025-07-07 RX ORDER — ASCORBIC ACID 500 MG
500 TABLET ORAL DAILY
Status: DISCONTINUED | OUTPATIENT
Start: 2025-07-07 | End: 2025-07-08 | Stop reason: HOSPADM

## 2025-07-07 RX ADMIN — HYDROCODONE BITARTRATE AND ACETAMINOPHEN 1 TABLET: 5; 325 TABLET ORAL at 22:06

## 2025-07-07 RX ADMIN — ACETAMINOPHEN 650 MG: 325 TABLET ORAL at 03:00

## 2025-07-07 RX ADMIN — IBUPROFEN 600 MG: 600 TABLET ORAL at 06:52

## 2025-07-07 RX ADMIN — ACETAMINOPHEN 650 MG: 325 TABLET ORAL at 10:16

## 2025-07-07 RX ADMIN — ACETAMINOPHEN 650 MG: 325 TABLET ORAL at 19:57

## 2025-07-07 RX ADMIN — FERROUS SULFATE TAB 325 MG (65 MG ELEMENTAL FE) 325 MG: 325 (65 FE) TAB at 19:08

## 2025-07-07 RX ADMIN — IBUPROFEN 600 MG: 600 TABLET ORAL at 18:21

## 2025-07-07 RX ADMIN — OXYCODONE HYDROCHLORIDE AND ACETAMINOPHEN 500 MG: 500 TABLET ORAL at 10:15

## 2025-07-07 RX ADMIN — ENOXAPARIN SODIUM 40 MG: 100 INJECTION SUBCUTANEOUS at 22:00

## 2025-07-07 RX ADMIN — FERROUS SULFATE TAB 325 MG (65 MG ELEMENTAL FE) 325 MG: 325 (65 FE) TAB at 10:15

## 2025-07-07 RX ADMIN — IBUPROFEN 600 MG: 600 TABLET ORAL at 12:27

## 2025-07-07 RX ADMIN — SERTRALINE HYDROCHLORIDE 100 MG: 100 TABLET ORAL at 10:15

## 2025-07-07 RX ADMIN — HYDROCODONE BITARTRATE AND ACETAMINOPHEN 1 TABLET: 5; 325 TABLET ORAL at 10:15

## 2025-07-07 RX ADMIN — ACETAMINOPHEN 650 MG: 325 TABLET ORAL at 14:42

## 2025-07-07 RX ADMIN — PRENATAL VITAMINS-IRON FUMARATE 27 MG IRON-FOLIC ACID 0.8 MG TABLET 1 TABLET: at 10:15

## 2025-07-07 NOTE — PROGRESS NOTES
"    Dwayne Thurman MD  [facility]  [address]  [place] [address]  Office [phone]  Fax [phone]      [facility]   PROGRESS NOTE    Post-Op Day 3 S/P   Subjective     Patient reports:  In NICU currently holding baby. Pain is well controlled with ERAS.  She is ambulating but with difficulty. Reports dizziness with standing. Once sitting, she feels ok and dizziness resolves. No SOB/chest pain. Denies other preeclampsia symptoms including headache, changes in vision or RUQ pain. Tolerating diet. Voiding - without difficulty; flatus reported..  Vaginal bleeding is as much as expected.    Breastfeeding: infant latching.      Objective      Vitals: Vital Signs Range for the last 24 hours  Temperature: Temp:  [96.8 °F (36 °C)-98 °F (36.7 °C)] 96.8 °F (36 °C)   Temp Source: Temp src: Temporal   BP: BP: (135-144)/(73-84) 141/79   Pulse: Heart Rate:  [84-94] 94   Respirations: Resp:  [16-18] 16   SPO2: SpO2:  [98 %-100 %] 98 %   O2 Amount (l/min):     O2 Devices Device (Oxygen Therapy): room air   Weight:         Visit Vitals  /79 (BP Location: Right arm, Patient Position: Sitting)   Pulse 94   Temp 96.8 °F (36 °C) (Temporal)   Resp 16   Ht 158.8 cm (62.5\")   Wt 95.3 kg (210 lb)   SpO2 98%   Breastfeeding Yes   BMI 37.80 kg/m²         I/Os: Intake/Output                   25 0701 - 25 0700     [id number] [id number] Total              Intake    I.V.  --  225 225    Total Intake -- 225 225       Output    Urine  2755  1200 3955    Total Output 2755 1200 3955             Physical Exam    Lungs Non-labored, symmetrical chest rise   Abdomen Soft, no TTP, no distension   Incision  Deferred, patient with NICU baby in arms/lap. Per notes, incision healing well   Extremities extremities normal, atraumatic, no cyanosis, +1 lower extremity edema and Homans sign is negative, no sign of DVT   Neuro CN2-12 grossly intact, no focal deficit, DTRs 2+, no clonus       Result Review "     I reviewed the patient's new clinical results.  Lab Results (last 24 hours)       ** No results found for the last 24 hours. **            Prenatal Labs  Lab Results   Component Value Date    HGB 9.2 (L) 2025    RUBELLAABIGG 0.92 (L) 2024    HEPBSAG Negative 2024    ABORH A Rh Negative 2014    ABSCRN Negative 2025    IPY8BAG2 Non Reactive 2024    HEPCVIRUSABY Negative 10/26/2017    GCT 81 2025    STREPGPB NEG 2017    URINECX Final report 2024    CHLAMNAA Negative 2024    NGONORRHON Negative 2024       Immunizations:   Immunization History   Administered Date(s) Administered    Rho (D) Immune Globulin 2017, 2017, 2025, 2025    Tdap 2017, 2025     Lab Results (last 24 hours)       ** No results found for the last 24 hours. **            CBC          7/3/2025    11:56 2025    20:02 2025    06:17   CBC   WBC 8.28  9.74  10.44    RBC 3.99  3.69  3.16    Hemoglobin 11.4  10.7  9.2    Hematocrit 34.0  31.8  28.0    MCV 85.2  86.2  88.6    MCH 28.6  29.0  29.1    MCHC 33.5  33.6  32.9    RDW 12.4  12.3  12.3    Platelets 147  162  141        Postpartum Depression: High Risk (2025)    S[place]cer  Depression Scale     Last EPDS Total Score: 11     Last EPDS Self Harm Result: Never              Assessment & Plan        * No active hospital problems. *        Assessment:    Sheila GENNARO Yeung is Day 3  post-partum  , Low Transverse  .       Plan:    continue post op care.  Check PIH labs as dizzy - discussed possible IV iron transfusion/blood transfusion  Check orthostatics  Baby - possible discharge tomorrow  Plan for monitoring today with anticipation for discharge tomorrow      Dwayne Thurman MD  2025  08:15 CDT

## 2025-07-07 NOTE — OUTREACH NOTE
[facility]  IP Postpartum    Questions/Answers      Flowsheet Row Responses   Best Method for Contacting Cell   Support Person Present No   Does the patient have a car seat at the hospital Yes   Delivery Note Reviewed Reviewed   Were birth expectations met? Yes   Is there a need for additional support/resources? No   Lactation Note Reviewed Reviewed   Is additional support needed? No   Any questions or concerns? No   Is the patient going to use Meds to Beds? Yes   Any concerns related discharge meds/ability to  prescriptions? No   Confirm Postpartum OB appointment Yes   Does patient have transportation to appointments? Yes   Does patient have supplies needed at home for  care? Breast Pump, Clothing, Crib, Diapers, Formula          Postpartum telephone check-in appt scheduled and reminder form given.     Ximena Olivas RN  Maternity Nurse Navigator    2025, 10:05 CDT

## 2025-07-07 NOTE — PLAN OF CARE
Goal Outcome Evaluation:  Plan of Care Reviewed With: patient        Progress: improving  Outcome Evaluation: VSS, ff ml u1 scant lochia, alt incision with glue, hx of GHTN, reflexes brisk 3+, no clonus, no headache/vision changes/epigastric pain,   +1 edema noted in lower extremities, voiding and ambulating, ERAS and norco x1 this shift for pain, pumping, visits infant in nicu, declines tdap and mmr vaccine, spouse at bedside

## 2025-07-07 NOTE — PLAN OF CARE
Goal Outcome Evaluation:  Plan of Care Reviewed With: patient, spouse        Progress: improving  Outcome Evaluation: VSS; FF/ML/U1 with scant lochia, incision with glue clean dry and intact, pt is voiding and ambulating, ERAS with norco x1, IV venofer on hold at this time, labs in the morning, pt denies complaints of HA or vision changes, reflexes +2, +3, no clonus, no dizziness stated when gettting up today once looking at the wall to stand up, IV started by VAT team today, orthstatic BPs done today, patient going to NICU to see infant

## 2025-07-07 NOTE — LACTATION NOTE
"This note was copied from a baby's chart.  Mother's Name: Sheila \"Mando\"  Infant's Name: Sanjay BREAUX 2025 @2105  Day: 3  Dx: TTN  Birth Gestation: 37w0d  Adjusted Gestation:  Birth weight: 7-5 (3317 g)  Last weight:  6-14.4 (3130g)   % of weight loss:-5.64%    Feeding Orders: 50 ml every 3 hours limited bfing to 30 mins  Maternal Hx: , BF 2 previous children for 1 year each, Depression, GHTN HSV 1, +THC on prenatal negative on admission.    Prenatal Medications: Aspirin, Zoloft, PNV, Zofran   Pump available: yes, Spectra and wearable medela for home. Using hospital pump now.   Pumping history in the last 24 hours: has not pumped since last night     Visit with patients parents in nicu room. Mother holding infant STS. She reports infant just finished feeding for 10 mins on right breast and 15 mins on left. RN to room and performed ac/pc weight on warmer vs stand alone scale. Weight had to be repeated 3 times with assumed gain of 40 mls. Parents state they were told to limit bfing attempts to 10 mins on each breast due to goal of infant transferring minimal prescribed amount in under 30 mins. Mother is an experienced breastfeeder of 2 other children and both parents voicing frustration of this as they realize this is not a realistic expectation. Acknowledged there are limitations to working on breastfeeding with nicu infants, encouraged continued pumping after feeding attempts to protect milk supply. Mother admits she has not pumped since last night and was becoming discouraged by the minimal amounts collected but does confirm her breasts have become more full throughout today and she is able to compare breasts softening with infant feeding. RN allows infant to go back to breast as he began rooting once again. Observed mother latch infant independently, assisted to adjust position slightly by turning infant inward belly to belly. With permission, breast assessment performed, bilateral breasts with fullness and " focal areas of knots/increased fullness. Observed infant with good sucks and several swallows at breast. 2nd post weight on bedscale did not show any gain. Advised future pre/post weights be completed on stand alone scale rather than bed/warmer scale for better accuracy. Rn returns to room with bottle of formula and states infant had been receiving dbm up until this morning when they transitioned to formula due to then anticipated rooming in tonight. Rooming in was cancelled as infant did not transfer well on this mornings feeding but has improved though the day, as has mothers milk supply. Reiterated and reassured mother her milk supply is meeting her infant's needs for a typical 2.5-3 day old infant. Reiterated average feeding amounts for a well baby at day 3 would be 15-30 mls, infants feedings orders are much higher than mother is able to naturally supply. But due to infant receiving additional feeding AND nicu admission, it is crucial she pump after every feeding attempt or with every feeding session. Parents voice understanding.     1845  Visit with mother in her room to assess flange fit, mother has been using 21 mm flanges and reports discomfort. Measured for 18mm as best fit, provided 18 mm flanges and observed mother begin to pump. Milk immediately begins expressing. Reiterated goal is that as long as infant requires supplement to meet ordered feeding amounts, mother should be able to pump out needed amount and supplement with EBM vs formula. Mother admits she realizes this should have been the goal and she was reluctant to give formula but felt as though formula was being strongly encouraged in order to meet earlier discharge. 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 Encouragement and support provided. Encouraging follow up with [facility] lactation after discharge to monitor infant feeding quality/transfer and weight gain.

## 2025-07-08 ENCOUNTER — LACTATION ENCOUNTER (OUTPATIENT)
Dept: OTHER | Facility: HOSPITAL | Age: 35
End: 2025-07-08

## 2025-07-08 VITALS
RESPIRATION RATE: 18 BRPM | TEMPERATURE: 98.4 F | DIASTOLIC BLOOD PRESSURE: 85 MMHG | OXYGEN SATURATION: 99 % | HEIGHT: 63 IN | HEART RATE: 83 BPM | SYSTOLIC BLOOD PRESSURE: 148 MMHG | BODY MASS INDEX: 37.21 KG/M2 | WEIGHT: 210 LBS

## 2025-07-08 PROBLEM — Z98.891 STATUS POST REPEAT LOW TRANSVERSE CESAREAN SECTION: Status: ACTIVE | Noted: 2025-07-08

## 2025-07-08 LAB
CYTO UR: NORMAL
CYTO UR: NORMAL
DEPRECATED RDW RBC AUTO: 41.5 FL (ref 37–54)
ERYTHROCYTE [DISTWIDTH] IN BLOOD BY AUTOMATED COUNT: 13.1 % (ref 12.3–15.4)
HCT VFR BLD AUTO: 25.4 % (ref 34–46.6)
HGB BLD-MCNC: 8.2 G/DL (ref 12–15.9)
LAB AP CASE REPORT: NORMAL
LAB AP CASE REPORT: NORMAL
Lab: NORMAL
Lab: NORMAL
MCH RBC QN AUTO: 28.8 PG (ref 26.6–33)
MCHC RBC AUTO-ENTMCNC: 32.3 G/DL (ref 31.5–35.7)
MCV RBC AUTO: 89.1 FL (ref 79–97)
PATH REPORT.FINAL DX SPEC: NORMAL
PATH REPORT.FINAL DX SPEC: NORMAL
PATH REPORT.GROSS SPEC: NORMAL
PATH REPORT.GROSS SPEC: NORMAL
PLATELET # BLD AUTO: 173 10*3/MM3 (ref 140–450)
PMV BLD AUTO: 11 FL (ref 6–12)
RBC # BLD AUTO: 2.85 10*6/MM3 (ref 3.77–5.28)
WBC NRBC COR # BLD AUTO: 6.28 10*3/MM3 (ref 3.4–10.8)

## 2025-07-08 PROCEDURE — 85027 COMPLETE CBC AUTOMATED: CPT | Performed by: OBSTETRICS & GYNECOLOGY

## 2025-07-08 PROCEDURE — 0503F POSTPARTUM CARE VISIT: CPT | Performed by: ADVANCED PRACTICE MIDWIFE

## 2025-07-08 RX ORDER — IBUPROFEN 200 MG
400 TABLET ORAL EVERY 4 HOURS PRN
Start: 2025-07-08

## 2025-07-08 RX ORDER — VALACYCLOVIR HYDROCHLORIDE 1 G/1
2000 TABLET, FILM COATED ORAL 2 TIMES DAILY
Qty: 12 TABLET | Refills: 0 | Status: SHIPPED | OUTPATIENT
Start: 2025-07-08 | End: 2025-07-16

## 2025-07-08 RX ORDER — ACETAMINOPHEN 325 MG/1
650 TABLET ORAL EVERY 4 HOURS PRN
Start: 2025-07-08

## 2025-07-08 RX ORDER — VALACYCLOVIR HYDROCHLORIDE 1 G/1
2000 TABLET, FILM COATED ORAL 2 TIMES DAILY
Qty: 4 TABLET | Refills: 0 | Status: SHIPPED | OUTPATIENT
Start: 2025-07-08 | End: 2025-07-08

## 2025-07-08 RX ORDER — HYDROCODONE BITARTRATE AND ACETAMINOPHEN 5; 325 MG/1; MG/1
1 TABLET ORAL EVERY 6 HOURS PRN
Qty: 10 TABLET | Refills: 0 | Status: SHIPPED | OUTPATIENT
Start: 2025-07-08

## 2025-07-08 RX ADMIN — ACETAMINOPHEN 650 MG: 325 TABLET ORAL at 01:04

## 2025-07-08 RX ADMIN — FERROUS SULFATE TAB 325 MG (65 MG ELEMENTAL FE) 325 MG: 325 (65 FE) TAB at 09:48

## 2025-07-08 RX ADMIN — OXYCODONE HYDROCHLORIDE AND ACETAMINOPHEN 500 MG: 500 TABLET ORAL at 09:49

## 2025-07-08 RX ADMIN — ACETAMINOPHEN 650 MG: 325 TABLET ORAL at 09:49

## 2025-07-08 RX ADMIN — SERTRALINE HYDROCHLORIDE 100 MG: 100 TABLET ORAL at 09:49

## 2025-07-08 RX ADMIN — IBUPROFEN 600 MG: 600 TABLET ORAL at 11:48

## 2025-07-08 RX ADMIN — HYDROCODONE BITARTRATE AND ACETAMINOPHEN 1 TABLET: 5; 325 TABLET ORAL at 02:17

## 2025-07-08 RX ADMIN — IBUPROFEN 600 MG: 600 TABLET ORAL at 05:11

## 2025-07-08 RX ADMIN — IBUPROFEN 600 MG: 600 TABLET ORAL at 00:31

## 2025-07-08 RX ADMIN — HYDROCODONE BITARTRATE AND ACETAMINOPHEN 1 TABLET: 10; 325 TABLET ORAL at 11:55

## 2025-07-08 RX ADMIN — PRENATAL VITAMINS-IRON FUMARATE 27 MG IRON-FOLIC ACID 0.8 MG TABLET 1 TABLET: at 09:49

## 2025-07-08 NOTE — DISCHARGE INSTR - APPOINTMENTS
Appointment with Fabian Rondon on July 16th at 2:30 pm    Appointmentwith  on August 20th at 11:00 am

## 2025-07-08 NOTE — LACTATION NOTE
"This note was copied from a baby's chart.  Mother's Name: Sheila \"Mando\"  Infant's Name: Sanjay BREAUX 2025 @2105  Day: 4  Dx: TTN  Birth Gestation: 37w0d  Adjusted Gestation:  Birth weight: 7-5 (3317 g)  Last weight:  6-14.1(3120g)  % of weight loss:-5.94%    Feeding Orders: 60 ml every 3 hours limited bfing to 30 mins  Maternal Hx: , BF 2 previous children for 1 year each, Depression, GHTN HSV 1, +THC on prenatal negative on admission.    Prenatal Medications: Aspirin, Zoloft, PNV, Zofran   Pump available: yes, Spectra and wearable medela for home. Using hospital pump now.   Pumping history in the last 24 hours: has not pumped since last night     F/u with parents in hospitality room, infant rooming in now. Mother in bathroom, fob holding infant with bottle of formula by his side. He reports continued progress and working on feedings, states plans are for discharge tomorrow as long as infant doesn't lose weight. Fob voices concern of infant receiving formula still, at this time mother comes out of bathroom and both parents voice concerns and disapproval of infants feeding goals to be so high and focus is more on \"pushing formula\" than breastfeeding. Mother also states she does not want infant to begin preferring formula over breastfeeding.  Acknowledge concerns, encourage mom to allow infant to feed on demand, discussed benefits of this, both parents are well aware breastfeeding on demand more effective protecting and promoting milk transition and milk transfer. Mother does admit she has not pumped anymore, she does not like pumping. Strongly advised pumping with every feeding due to infant continuing to receive large amounts of formula. Discussed risks of low milk supply, poor feedings at the breast and likeliness of infant developing bottle preference if mother does not work on pumping and protecting milk supply. Offered to assist and perform ac/pc weight check with next feeding now that mother's milk is " transitioning and ac/pc weight checks more appropriate today. Reiterated to both parents in hopes of offering encouragement, infant actually performed VERY WELL at breast yesterday! He was transferring above average amounts for a term infant. Reviewed average feeding amount handout noting yesterday morning while infant was still 2 days old, average feeding of 5-15 mls and infant transferred 10 mls at first attempt, up to 40 mls with subsequent feedings, this is great! Transitioning from day 3-4 average amounts increasing to 30-45 ml. Parents are interested in ac/pc weight check at 7 pm feeding. Breastfeeding after discharge handout given and briefly reviewed with mother. 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 Parents deny questions at this time. Will plan on ac/pc weight check at next feeding and reassured parents lactation staff member Katy will be available throughout night if they would like to repeat ac/pc feedings. Encouragement and support provided.      Signs of Milk: Fullness, firmness, heaviness of breasts, leaking of milk.  Signs of Good Feed: Breast fullness prior to feed, breasts soft and comfortable after feeding. Infant content after feeding: calm, sleepy, relaxed and without continued hunger cues.  Signs of Plugged Ducts, Engorgement and Mastitis: Plugged ducts (milk entrapment in milk ducts)- small tender knots that often feel like little beans under breast tissue, usually tender. Massage on these areas of concern while breastfeeding or pumping to promote emptying.   Engorgement- fluid or excess milk, breasts become uncomfortably full, tight, firm (compare to the firmness of your cheek (mild), chin (moderate) or forehead (severe). First line of treatment should be to BREASTFEED, if breasts remain full feeling after a feeding, it may be necessary to  pump, ONLY UNTIL BREASTS ARE SOFT AND COMFORTABLE. DO NOT OVER PUMP (complete emptying of breasts can trigger even more milk which will cause continued, recurrent Engorgement).  Mastitis- Infection of the breast tissue, most often caused by plugged ducts that are not adequately treated by emptying, recurrent trauma to nipples or breasts (cracked or bleeding nipples). Signs: redness, swelling, tender knots or fever to breasts as well as generalized fever >101 degrees F that is often sudden onset. Treatment of mastitis, BREASTFEED! Pump after breastfeeding to achieve COMPLETE emptying of affected breast, utilizing massage to areas of concern, may use cold compress to affected area only after breast emptying. May take anti-inflammatories i.e. Ibuprofen, Motrin. CALL your OB for assessment and continued treatment with Antibiotics to adequately treat mastitis.  Infant Care: Over the first 2 weeks it is important to keep record of infant's feeding routine (feeding times and durations), wet and dirty diaper frequency, stool color and any spit ups that may occur.  Keep in mind, ALL babies will lose some weight initially (usually no more than 10% by day 3). Until infant returns to/ surpasses birth weight (which can take up to 2 weeks), it is important to offer feedings AT LEAST EVERY 3 HOURS. Remember, if you choose to supplement infant with formula or previously pumped milk, you should always pump in replacement of that feeding in order to promote and maintain a healthy milk supply!  Maternal Care: REST, sleep when the infant sleeps, stay hydrated (water is optimal) drink to thirst, increase caloric intake - breastfeeding mother's need an ADDITIONAL 500 calories per day , eat 3 meals/day as well as snacks in between, limit CAFFIENE intake to 2 cups/day. Ask your significant other, family members or friends for help when needed, taking advantage of meal trains, allowing others to help with laundry, house chores, etc can help  you focus on what is most important early on after delivery… you and your infant, and breastfeeding!   Medications to CONTINUE: Prenatal Vitamins are important to continue taking while breastfeeding. Fish oil, magnesium/calcium supplements often are helpful to support Mothers and their milk supply as well. Tylenol, Ibuprofen, regular Zyrtec, Claritin are SAFE if you suffer from seasonal allergies. Flonase is safe and often an effective medication to take if suffering from sinus drainage/pressure.  Medications to AVOID: Benadryl, Sudafed, any medications including “DM” or have a drying effect to sinus drainage will also dry a mother's milk up. Birth control- your OB will want to address birth control options with you usually around 4-6 weeks postpartum, be sure to notify your MD if you continue to breastfeed as some birth controls may significantly decrease your milk supply. Herbals- some herbs may also decrease your milk supply: PEPPERMINT, MENTHOL in any form (candies, essential oils, teas, etc), so check labels and avoid using in excess.  Pumping: Although we encourage you to focus on breastfeeding over the first 2-4 weeks, you will need to plan to begin pumping. We do recommend implementing pumping by the time infant is 4 weeks old. Pump 2-3 times per day immediately AFTER breastfeeding, it is normal to collect very small amounts initially, but the more consistently you pump, the more you will begin to collect. Store collected milk in refrigerator or freezer. You should also begin offering infant a bottle around 4 weeks. Remember to use slow flow nipples and PACE the bottle-feed. A bottle feed should take about as long as a breastfeeding session.      How much should my  baby eat “Breastfeeding and Human Lactation” Solange, 4th ed., pg. 228, 2010  Average Feeding Amounts    Age Ounces Milliliters Spoons   Day 1 Drops 5 Less than 1 teaspoon   Day 2 Less than ½ ounce 5-15 Less than 1 Tbsp.    Day 3 ½ - 1 15-30 1-2 Tbsp.   Day 4 1 - 1½  30-45 2-3 Tbsp.   Day 5 1½ - 2 45-60 3-4 Tbsp.   1-3 Weeks 2-3 60-90    1-6 Months 3-5     “Milliliters (mls), cc’s, and grams (gms)” are interchangeable terms for measurement.  30 mls = 1 ounce          Yes

## 2025-07-08 NOTE — NURSING NOTE
Aravind from [facility] called and stated pt needs prior auth.   Patient's meds to bed has been delivered. Patient now switched to hospitality room. Patient voices understanding.

## 2025-07-08 NOTE — PROGRESS NOTES
SANJANA Shin  [facility]  [address]  [place] [address]  Office [phone]  Fax [phone]      [facility]   PROGRESS NOTE    Post-Op Day 4 S/P   Subjective     Patient reports:  Pain is well controlled with ERAS protocol. Denies headache, visual changes, or right upper quadrant pain.  She is ambulating. Tolerating diet. Voiding - without difficulty; flatus reported..  Vaginal bleeding is as much as expected.      Objective      Vitals: Vital Signs Range for the last 24 hours  Temperature: Temp:  [97.6 °F (36.4 °C)-98.9 °F (37.2 °C)] 97.6 °F (36.4 °C)   Temp Source: Temp src: Temporal   BP: BP: (132-149)/(70-82) 143/75   Pulse: Heart Rate:  [78-95] 78   Respirations: Resp:  [16-18] 16   SPO2: SpO2:  [99 %-100 %] 100 %   O2 Amount (l/min):     O2 Devices Device (Oxygen Therapy): room air   Weight:              Physical Exam    Lungs Respirations even and unlabored.    Abdomen Soft, without significant tenderness. Fundus firm.    Incision  Well approximated with skin glue dressing intact. No signs of drainage, erythem, or seroma.   Extremities Calves nontender. Negative homans sign. trace edema.      I reviewed the patient's new clinical results.  Lab Results (last 24 hours)       Procedure Component Value Units Date/Time    CBC (No Diff) [[id number]]  (Abnormal) Collected: 25 0707    Specimen: Blood Updated: 25 0713     WBC 6.28 10*3/mm3      RBC 2.85 10*6/mm3      Hemoglobin 8.2 g/dL      Hematocrit 25.4 %      MCV 89.1 fL      MCH 28.8 pg      MCHC 32.3 g/dL      RDW 13.1 %      RDW-SD 41.5 fl      MPV 11.0 fL      Platelets 173 10*3/mm3     Iron Profile w/o Ferritin [[id number]]  (Abnormal) Collected: 25 1235    Specimen: Blood Updated: 25 1518     Iron 254 mcg/dL      Iron Saturation (TSAT) 43 %      Transferrin 397 mg/dL      TIBC 592 mcg/dL     Ferritin [[id number]]  (Normal) Collected: 25 1235    Specimen: Blood Updated: 25 5300      Ferritin 31.49 ng/mL     Narrative:      Results may be falsely decreased if patient taking Biotin.      Comprehensive Metabolic Panel [[id number]]  (Abnormal) Collected: 07/07/25 1235    Specimen: Blood Updated: 07/07/25 1323     Glucose 78 mg/dL      BUN 6.6 mg/dL      Creatinine 0.46 mg/dL      Sodium 138 mmol/L      Potassium 4.1 mmol/L      Chloride 105 mmol/L      CO2 23.0 mmol/L      Calcium 8.6 mg/dL      Total Protein 5.8 g/dL      Albumin 3.0 g/dL      ALT (SGPT) 16 U/L      AST (SGOT) 29 U/L      Alkaline Phosphatase 150 U/L      Total Bilirubin <0.2 mg/dL      Globulin 2.8 gm/dL      A/G Ratio 1.1 g/dL      BUN/Creatinine Ratio 14.3     Anion Gap 10.0 mmol/L      eGFR 129.0 mL/min/1.73     Narrative:      GFR Categories in Chronic Kidney Disease (CKD)              GFR Category          GFR (mL/min/1.73)    Interpretation  G1                    90 or greater        Normal or high (1)  G2                    60-89                Mild decrease (1)  G3a                   45-59                Mild to moderate decrease  G3b                   30-44                Moderate to severe decrease  G4                    15-29                Severe decrease  G5                    14 or less           Kidney failure    (1)In the absence of evidence of kidney disease, neither GFR category G1 or G2 fulfill the criteria for CKD.    eGFR calculation 2021 CKD-EPI creatinine equation, which does not include race as a factor    Lactate Dehydrogenase [[id number]]  (Abnormal) Collected: 07/07/25 1235    Specimen: Blood Updated: 07/07/25 1323      U/L     Uric Acid [[id number]]  (Normal) Collected: 07/07/25 1235    Specimen: Blood Updated: 07/07/25 1323     Uric Acid 3.9 mg/dL     CBC (No Diff) [[id number]]  (Abnormal) Collected: 07/07/25 1235    Specimen: Blood Updated: 07/07/25 1255     WBC 7.04 10*3/mm3      RBC 2.95 10*6/mm3      Hemoglobin 8.6 g/dL      Hematocrit 26.5 %      MCV 89.8 fL      MCH 29.2 pg      MCHC 32.5  g/dL      RDW 12.9 %      RDW-SD 41.8 fl      MPV 11.5 fL      Platelets 182 10*3/mm3     Tissue Pathology Exam [[id number]] Collected: 07/04/25 2115    Specimen: Tissue from Fallopian Tube, Left; Tissue from Fallopian Tube, Right; Tissue from Placenta Updated: 07/07/25 1154    Tissue Pathology Exam [[id number]] Updated: 07/07/25 0827    Specimen: Tissue from Placenta; Tissue from Fallopian Tube, Left; Tissue from Fallopian Tube, Right             External Prenatal Results       Pregnancy Outside Results - Transcribed From Office Records - See Scanned Records For Details       Test Value Date Time    ABO  A  07/05/25 2329    Rh  Negative  07/05/25 2329    Antibody Screen  Negative  07/05/25 2329       Negative  07/04/25 2002       Negative  05/01/25 0858       Negative  12/13/24 1050    Varicella IgG  Reactive  12/13/24 1050    Rubella  0.92 index 12/13/24 1050    Hgb  8.6 g/dL 07/07/25 1235       9.2 g/dL 07/05/25 0617       10.7 g/dL 07/04/25 2002       11.4 g/dL 07/03/25 1156       10.6 g/dL 06/16/25 1125       11.2 g/dL 05/20/25 0949       11.8 g/dL 05/01/25 0858       13.0 g/dL 12/13/24 1050    Hct  26.5 % 07/07/25 1235       28.0 % 07/05/25 0617       31.8 % 07/04/25 2002       34.0 % 07/03/25 1156       31.3 % 06/16/25 1125       34.5 % 05/20/25 0949       38.1 % 12/13/24 1050    HgB A1c        1h GTT  81 mg/dL 05/01/25 0858    3h GTT Fasting       3h GTT 1 hour       3h GTT 2 hour       3h GTT 3 hour        Gonorrhea (discrete)  Negative  12/13/24 1050    Chlamydia (discrete)  Negative  12/13/24 1050    RPR  Non Reactive  05/01/25 0858       Non Reactive  12/13/24 1050    Syphils cascade: TP-Ab (FTA)  Non-Reactive  07/04/25 2002    TP-Ab  Non-Reactive  07/04/25 2002    TP-Ab (EIA)       TPPA       HBsAg  Negative  12/13/24 1050    Herpes Simplex Virus PCR       Herpes Simplex VIrus Culture       HIV  Non Reactive  12/13/24 1050    Hep C RNA Quant PCR       Hep C Antibody       AFP       NIPT       Cystic  Fibrosis (Nghia)       Cystic Fibroisis        Spinal Muscular atrophy       Fragile X       Group B Strep       GBS Susceptibility to Clindamycin       GBS Susceptibility to Erythromycin       Fetal Fibronectin       Genetic Testing, Maternal Blood                 Drug Screening       Test Value Date Time    Urine Drug Screen       Amphetamine Screen  Negative  25 1649    Barbiturate Screen  Negative  25 1649    Benzodiazepine Screen  Negative  25 1649    Methadone Screen  Negative  25 1649    Phencyclidine Screen  Negative  25 1649    Opiates Screen  Negative  25 1649    THC Screen  Negative  25 1649    Cocaine Screen       Propoxyphene Screen       Buprenorphine Screen  Negative  25 1649    Methamphetamine Screen       Oxycodone Screen  Negative  25 1649    Tricyclic Antidepressants Screen  Negative  259              Legend    ^: Historical                            Assessment & Plan        * No active hospital problems. *      Assessment:    Sheila Yeung is Day 4  post-partum  , Low Transverse  .       Plan:  Continue current postpartum and postoperative care. Discontinue IV. Discharge instructions provided, including postpartum blood pressure warning signs. Understanding verbalized and questions answered. Lactation support as needed while rooming in. Rhogam received. Follow-up in clinic within 1 week for blood pressure check.      Plan for discharge reviewed with Dr. Oritz.     This note has been signed electronically.    Mariama Rondon, NICOLE, APRN, CNM, RNC-OB  2025  08:11 CDT

## 2025-07-08 NOTE — DISCHARGE SUMMARY
Mariama Rondon, APRN  [facility]  [address]  [place] [address]  Office [phone]  Fax [phone]    Discharge Summary    [facility]  Sheila Yeung  : 1990  MRN: [id number]  CSN: [id number]    Date of Admission: 2025   Date of Discharge:  2025   Delivering Physician: Ana Ortiz MD       Admission Diagnosis: Previous  section [Z98.891]  Previous  delivery affecting pregnancy [O34.219]   Discharge Diagnosis: Pregnancy at 37w0d - delivered       Procedures: Repeat  (LTCS) with bilateral total salpingectomy     Hospital Course  See the completed operative report for details regarding antepartum course and delivery.    Her postoperative course was unremarkable.  On POD # 4 she felt like she was ready for discharge.  She was evaluated by Dr. Ortiz who agreed she was able to be discharged to home.  She had no febrile morbidity. She had normal bowel and bladder function and was hemodynamically stable.  Her wound was healing well without obvious signs of infections.    Infant  male fetus weighing 3317 g (7 lb 5 oz)  Apgars -  7 @ 1 minute /  7 @ 5 minutes.    Discharge labs  Lab Results   Component Value Date    WBC 6.28 2025    HGB 8.2 (L) 2025    HCT 25.4 (L) 2025     2025       Discharge Medications     Discharge Medications        New Medications        Instructions Start Date   acetaminophen 325 MG tablet  Commonly known as: TYLENOL   650 mg, Oral, Every 4 Hours PRN      HYDROcodone-acetaminophen 5-325 MG per tablet  Commonly known as: NORCO   1 tablet, Oral, Every 6 Hours PRN      ibuprofen 200 MG tablet  Commonly known as: ADVIL,MOTRIN   400 mg, Oral, Every 4 Hours PRN             Continue These Medications        Instructions Start Date   ondansetron ODT 4 MG disintegrating tablet  Commonly known as: ZOFRAN-ODT   4 mg, Translingual, Every 6 Hours PRN      prenatal  (CLASSIC) vitamin 28-0.8 MG tablet tablet  Generic drug: prenatal vitamin   Daily      sertraline 100 MG tablet  Commonly known as: ZOLOFT   TAKE 1 TABLET BY MOUTH TWICE A DAY for 30             Stop These Medications      BABY ASPIRIN PO     ondansetron 4 MG tablet  Commonly known as: Zofran              External Prenatal Results       Pregnancy Outside Results - Transcribed From Office Records - See Scanned Records For Details       Test Value Date Time    ABO  A  07/05/25 2329    Rh  Negative  07/05/25 2329    Antibody Screen  Negative  07/05/25 2329       Negative  07/04/25 2002       Negative  05/01/25 0858       Negative  12/13/24 1050    Varicella IgG  Reactive  12/13/24 1050    Rubella  0.92 index 12/13/24 1050    Hgb  8.2 g/dL 07/08/25 0707       8.6 g/dL 07/07/25 1235       9.2 g/dL 07/05/25 0617       10.7 g/dL 07/04/25 2002       11.4 g/dL 07/03/25 1156       10.6 g/dL 06/16/25 1125       11.2 g/dL 05/20/25 0949       11.8 g/dL 05/01/25 0858       13.0 g/dL 12/13/24 1050    Hct  25.4 % 07/08/25 0707       26.5 % 07/07/25 1235       28.0 % 07/05/25 0617       31.8 % 07/04/25 2002       34.0 % 07/03/25 1156       31.3 % 06/16/25 1125       34.5 % 05/20/25 0949       38.1 % 12/13/24 1050    HgB A1c        1h GTT  81 mg/dL 05/01/25 0858    3h GTT Fasting       3h GTT 1 hour       3h GTT 2 hour       3h GTT 3 hour        Gonorrhea (discrete)  Negative  12/13/24 1050    Chlamydia (discrete)  Negative  12/13/24 1050    RPR  Non Reactive  05/01/25 0858       Non Reactive  12/13/24 1050    Syphils cascade: TP-Ab (FTA)  Non-Reactive  07/04/25 2002    TP-Ab  Non-Reactive  07/04/25 2002    TP-Ab (EIA)       TPPA       HBsAg  Negative  12/13/24 1050    Herpes Simplex Virus PCR       Herpes Simplex VIrus Culture       HIV  Non Reactive  12/13/24 1050    Hep C RNA Quant PCR       Hep C Antibody       AFP       NIPT       Cystic Fibrosis (Nghia)       Cystic Fibroisis        Spinal Muscular atrophy       Fragile X        Group B Strep       GBS Susceptibility to Clindamycin       GBS Susceptibility to Erythromycin       Fetal Fibronectin       Genetic Testing, Maternal Blood                 Drug Screening       Test Value Date Time    Urine Drug Screen       Amphetamine Screen  Negative  07/05/25 1649    Barbiturate Screen  Negative  07/05/25 1649    Benzodiazepine Screen  Negative  07/05/25 1649    Methadone Screen  Negative  07/05/25 1649    Phencyclidine Screen  Negative  07/05/25 1649    Opiates Screen  Negative  07/05/25 1649    THC Screen  Negative  07/05/25 1649    Cocaine Screen       Propoxyphene Screen       Buprenorphine Screen  Negative  07/05/25 1649    Methamphetamine Screen       Oxycodone Screen  Negative  07/05/25 1649    Tricyclic Antidepressants Screen  Negative  07/05/25 1649              Legend    ^: Historical                            Discharge Disposition Home or Self Care   Condition on Discharge: good   Follow-up: 1 week with MAMTA Rondon for blood pressure check     Plan for discharge reviewed with Diana.    This note has been signed electronically.    Mariama Rondon, NICOLE, APRN, MAMTA  7/8/2025

## 2025-07-09 NOTE — LACTATION NOTE
This note was copied from a baby's chart.  Returned to room to assist with ac/pc weight assessment. Fob assisted to undress infant to diaper, this rn performed pre weight and then moved to mother sts. Infant initially fussy and reluctant to latch required multiple attempts before latch achieved. Once latched, infant immediately exhibits deep jaw drops and audible swallows consistently noted. At 10 mins infant self released from breast and sleepy. Mother attempts to burp infant. Infant then had large stool and became inconsolable. Attempted to relatch to right breast but infant would not settle. Post weight obtained, diaper changed and new preweight obtained which did show a 60 gram loss due to stool/wet diaper. Infant then moved to left breast. Assisted with compressing breast throughout feeding due to mother with plugged ducts palpable and reporting infant more reluctant to feed well on left breast. Infant nursed well for 18 mins and transferred additional 30 mls for total of 55 ml. Advised to withhold formula supplement for this feeding. Encourage on demand feeding, call RN or lactation when infant ready to feed again. Assisted mother to pump, collected 19 ml in 20 mins. Strongly encourage post feed pumping until mother's milk supply catches up with the large amount of supplementing infant has been given over previous 24 hours. Much encouragement and support provided. Parents voice appreciation for lactation support.

## 2025-07-14 ENCOUNTER — PATIENT OUTREACH (OUTPATIENT)
Dept: LABOR AND DELIVERY | Facility: HOSPITAL | Age: 35
End: 2025-07-14
Payer: COMMERCIAL

## 2025-07-14 NOTE — OUTREACH NOTE
Motherhood Connection  Postpartum Check-In    Questions/Answers      Flowsheet Row Responses   Visit Setting Telephone   Best Method for Contacting Cell   OB Discharge Note Reviewed  Reviewed   OB Discharge Navigator Reviewed  Reviewed   OB Discharge Medications Reviewed  Reviewed    discharged home with mother? Yes   Current Pain Levels 0-10 5   At Rest Pain Levels 0-10 4   Pain level with activity 0-10 6   Acceptable Pain Level 0-10 3   Pain Location Abdomen  [left side]   Pain Description Sharp, Stabbing   How do you treat your pain? Abdominal Binder, Medications, Position Changes, Pillow Support   Verbalized Emotional State Acceptance   Family/Support Network Family   Level of Involvement in Care Attentive, Interactive, Supportive   Do you feel comfortable in your relationship with your baby? Yes   Have members of your household adjusted to your baby? Yes   Is the baby's father supportive and/or involved with the baby? Yes   How does your partner feel about the baby? Happy, Involved   Do you feel safe at home, school and work? Yes   Are you in a relationship with someone who threatens you or hurts you? No   Do you have the resources to keep yourself and your baby healthy and safe? Yes   Lochia (per patient report) Rubra   Amount Scant   Number of pads per day 3   Lochia Odor None   Is patient breastfeeding? Yes, pumping   Postpartum Depression Screening Education Education Provided   Doctor Appointments: Education Provided   Postpartum Care Education Education Provided   S & S to report Education Provided   Followup Appointments Made Yes   Well Child Visit Appointments Made Yes   Appointment Date 25   Provider/Agency [facility]   Well Child Checkup Provider Name Kennethjessica Talley   Well Child Check Up Date: 25   Did you complete the visit? Yes   Were there any specific concerns? No   Umbilical Cord No reported signs or symptoms   Was the baby circumcised? Yes   Circumcision care and signs/symptoms to  report Reviewed   Feeding Readiness Cues: Cooing, Crying, Eager, Energy for feeding, Finger Sucking   Infant Feeding Method Breast, Expressed Breast Milk   Breastfeeding Right   Time breastfeeding left: 5-20   Time breastfeeding right: 5-20   Frequency of feedings at least every 3 hours   Expressed milk PO (mL) 1-2 oz   Expressed milk- frequency of feedings prn   Number of wet diapers x 24 hours 10   Last BM x 24 hours 6-8   Emesis (Unmeasured Occurence) scant   What safe sleep surface is available? Bassinet   Are there stuffed animals, toys, pillows, quilts, blankets, wedges, positioners, bumpers or other loose bedding in the infant's sleeping environment? No   Where does the baby usually sleep? Bassinet   Does the baby ever share a sleep surface with a sibling, adult or pet? No   Does the baby ever share a sleep surface in a bed, couch, recliner or other? No   What position do you place your baby to sleep for naps? Back   What position do you place your baby to sleep at night Back            Review of Systems    Most Recent [place]  Depression Scale Score (EPDS)    Performed by a clinician: 6 (2025  4:53 PM)      5 Ps Screen  completed    Mando shared with me she is having left sided pain.  We reviewed comfort measures.  She also shared with me she was having some headaches.  I encouraged her to go to the ER if anything worsened and to message her provider to share these concerns so she could be seen sooner in the office if needed.  She voiced understanding.     Ximena Olivas RN  Maternity Nurse Navigator    2025, 17:01 CDT

## 2025-07-16 ENCOUNTER — APPOINTMENT (OUTPATIENT)
Dept: CT IMAGING | Facility: HOSPITAL | Age: 35
End: 2025-07-16
Payer: COMMERCIAL

## 2025-07-16 ENCOUNTER — HOSPITAL ENCOUNTER (EMERGENCY)
Facility: HOSPITAL | Age: 35
Discharge: HOME | End: 2025-07-16
Attending: OBSTETRICS & GYNECOLOGY | Admitting: OBSTETRICS & GYNECOLOGY
Payer: COMMERCIAL

## 2025-07-16 ENCOUNTER — POSTPARTUM VISIT (OUTPATIENT)
Age: 35
End: 2025-07-16
Payer: COMMERCIAL

## 2025-07-16 VITALS
HEART RATE: 73 BPM | RESPIRATION RATE: 18 BRPM | DIASTOLIC BLOOD PRESSURE: 72 MMHG | HEIGHT: 62 IN | TEMPERATURE: 98.5 F | WEIGHT: 186 LBS | OXYGEN SATURATION: 100 % | BODY MASS INDEX: 34.23 KG/M2 | SYSTOLIC BLOOD PRESSURE: 153 MMHG

## 2025-07-16 VITALS
SYSTOLIC BLOOD PRESSURE: 134 MMHG | HEIGHT: 63 IN | BODY MASS INDEX: 32.96 KG/M2 | WEIGHT: 186 LBS | DIASTOLIC BLOOD PRESSURE: 90 MMHG

## 2025-07-16 DIAGNOSIS — Z48.89 ENCOUNTER FOR EXAMINATION OF SURGICAL SITE: Primary | ICD-10-CM

## 2025-07-16 DIAGNOSIS — Z13.32 ENCOUNTER FOR SCREENING FOR MATERNAL DEPRESSION: ICD-10-CM

## 2025-07-16 LAB
ALBUMIN SERPL-MCNC: 3.9 G/DL (ref 3.5–5.2)
ALBUMIN/GLOB SERPL: 1.1 G/DL
ALP SERPL-CCNC: 149 U/L (ref 39–117)
ALT SERPL W P-5'-P-CCNC: 26 U/L (ref 1–33)
ANION GAP SERPL CALCULATED.3IONS-SCNC: 12 MMOL/L (ref 5–15)
AST SERPL-CCNC: 24 U/L (ref 1–32)
BASOPHILS # BLD AUTO: 0.03 10*3/MM3 (ref 0–0.2)
BASOPHILS NFR BLD AUTO: 0.5 % (ref 0–1.5)
BILIRUB SERPL-MCNC: 0.2 MG/DL (ref 0–1.2)
BUN SERPL-MCNC: 11.1 MG/DL (ref 6–20)
BUN/CREAT SERPL: 23.6 (ref 7–25)
CALCIUM SPEC-SCNC: 9.1 MG/DL (ref 8.6–10.5)
CHLORIDE SERPL-SCNC: 107 MMOL/L (ref 98–107)
CO2 SERPL-SCNC: 21 MMOL/L (ref 22–29)
CREAT SERPL-MCNC: 0.47 MG/DL (ref 0.57–1)
DEPRECATED RDW RBC AUTO: 40.3 FL (ref 37–54)
EGFRCR SERPLBLD CKD-EPI 2021: 128.3 ML/MIN/1.73
EOSINOPHIL # BLD AUTO: 0.38 10*3/MM3 (ref 0–0.4)
EOSINOPHIL NFR BLD AUTO: 6.5 % (ref 0.3–6.2)
ERYTHROCYTE [DISTWIDTH] IN BLOOD BY AUTOMATED COUNT: 13 % (ref 12.3–15.4)
EXPIRATION DATE: NORMAL
GLOBULIN UR ELPH-MCNC: 3.4 GM/DL
GLUCOSE SERPL-MCNC: 82 MG/DL (ref 65–99)
HCT VFR BLD AUTO: 32.2 % (ref 34–46.6)
HGB BLD-MCNC: 10.5 G/DL (ref 12–15.9)
IMM GRANULOCYTES # BLD AUTO: 0.05 10*3/MM3 (ref 0–0.05)
IMM GRANULOCYTES NFR BLD AUTO: 0.9 % (ref 0–0.5)
LDH SERPL-CCNC: 293 U/L (ref 135–214)
LYMPHOCYTES # BLD AUTO: 1.48 10*3/MM3 (ref 0.7–3.1)
LYMPHOCYTES NFR BLD AUTO: 25.5 % (ref 19.6–45.3)
Lab: NORMAL
MCH RBC QN AUTO: 28.4 PG (ref 26.6–33)
MCHC RBC AUTO-ENTMCNC: 32.6 G/DL (ref 31.5–35.7)
MCV RBC AUTO: 87 FL (ref 79–97)
MONOCYTES # BLD AUTO: 0.35 10*3/MM3 (ref 0.1–0.9)
MONOCYTES NFR BLD AUTO: 6 % (ref 5–12)
NEUTROPHILS NFR BLD AUTO: 3.52 10*3/MM3 (ref 1.7–7)
NEUTROPHILS NFR BLD AUTO: 60.6 % (ref 42.7–76)
NRBC BLD AUTO-RTO: 0 /100 WBC (ref 0–0.2)
PLATELET # BLD AUTO: 253 10*3/MM3 (ref 140–450)
PMV BLD AUTO: 9.6 FL (ref 6–12)
POTASSIUM SERPL-SCNC: 4.1 MMOL/L (ref 3.5–5.2)
PROT SERPL-MCNC: 7.3 G/DL (ref 6–8.5)
PROT UR STRIP-MCNC: NEGATIVE MG/DL
RBC # BLD AUTO: 3.7 10*6/MM3 (ref 3.77–5.28)
SODIUM SERPL-SCNC: 140 MMOL/L (ref 136–145)
URATE SERPL-MCNC: 4.1 MG/DL (ref 2.4–5.7)
WBC NRBC COR # BLD AUTO: 5.81 10*3/MM3 (ref 3.4–10.8)

## 2025-07-16 PROCEDURE — 80053 COMPREHEN METABOLIC PANEL: CPT | Performed by: OBSTETRICS & GYNECOLOGY

## 2025-07-16 PROCEDURE — 99283 EMERGENCY DEPT VISIT LOW MDM: CPT

## 2025-07-16 PROCEDURE — 99202 OFFICE O/P NEW SF 15 MIN: CPT

## 2025-07-16 PROCEDURE — 81002 URINALYSIS NONAUTO W/O SCOPE: CPT | Performed by: OBSTETRICS & GYNECOLOGY

## 2025-07-16 PROCEDURE — 74178 CT ABD&PLV WO CNTR FLWD CNTR: CPT

## 2025-07-16 PROCEDURE — G0378 HOSPITAL OBSERVATION PER HR: HCPCS

## 2025-07-16 PROCEDURE — 99202 OFFICE O/P NEW SF 15 MIN: CPT | Performed by: OBSTETRICS & GYNECOLOGY

## 2025-07-16 PROCEDURE — 99283 EMERGENCY DEPT VISIT LOW MDM: CPT | Performed by: OBSTETRICS & GYNECOLOGY

## 2025-07-16 PROCEDURE — 25510000001 IOPAMIDOL 61 % SOLUTION: Performed by: OBSTETRICS & GYNECOLOGY

## 2025-07-16 PROCEDURE — 85025 COMPLETE CBC W/AUTO DIFF WBC: CPT | Performed by: OBSTETRICS & GYNECOLOGY

## 2025-07-16 PROCEDURE — 84550 ASSAY OF BLOOD/URIC ACID: CPT | Performed by: OBSTETRICS & GYNECOLOGY

## 2025-07-16 PROCEDURE — 83615 LACTATE (LD) (LDH) ENZYME: CPT | Performed by: OBSTETRICS & GYNECOLOGY

## 2025-07-16 RX ORDER — IOPAMIDOL 612 MG/ML
100 INJECTION, SOLUTION INTRAVASCULAR
Status: COMPLETED | OUTPATIENT
Start: 2025-07-16 | End: 2025-07-16

## 2025-07-16 RX ORDER — LABETALOL 100 MG/1
100 TABLET, FILM COATED ORAL 3 TIMES DAILY
Qty: 90 TABLET | Refills: 0 | Status: SHIPPED | OUTPATIENT
Start: 2025-07-16

## 2025-07-16 RX ORDER — TRAMADOL HYDROCHLORIDE 50 MG/1
50 TABLET ORAL EVERY 8 HOURS PRN
Qty: 10 TABLET | Refills: 0 | Status: SHIPPED | OUTPATIENT
Start: 2025-07-16 | End: 2026-07-16

## 2025-07-16 RX ADMIN — IOPAMIDOL 100 ML: 612 INJECTION, SOLUTION INTRAVENOUS at 17:05

## 2025-07-16 NOTE — PROGRESS NOTES
" Ana Yeung  : 1990  MRN: [id number]  CSN: [id number]    Addendum    Results discussed with Dr. Ji. Normal CT scan, in fact, described by radiologist over the phone to me as \"pristine considering her recent c. Section surgical status with no acute findings.\" CBC is better than discharge without evidence of infections. OK for discharge with BP meds to follow up next week with primary OB.     Labor & Delivery [facility] Postpartum progress note    Subjective   Chief Complaint: Patient states she's been having L>R pain since her . Today it began hurting worse, and when she picked up the baby she felt a pop and the pain became much more severe. Will get CBC and ultrasound to ensure no pelvic or incisional problems.    After seeing and evaluating patient, was informed later the patient had been seen and examined in the office today by Mariama Rondon and Dr. Jimenez. RN to contact the group for orders.    HPI:     History:    Prenatal Information:  Prenatal Results    The patient does not have a working LONG. Some results will not display without a working LONG.   Initial Prenatal Labs       Test Value Reference Range Date Time    Hemoglobin        Hematocrit        Platelets        Rubella IgG        Hepatitis B SAg        Hepatitis C Ab        RPR        T. Pallidum Ab         ABO  A   25    Rh  Negative   25    Antibody Screen        HIV        Urine Culture        Gonorrhea        Chlamydia        TSH        HgB A1c         Varicella IgG        Hemoglobinopathy Fractionation        Hemoglobinopathy (genetic testing)        Cystic fibrosis         Spinal muscular atrophy        Fragile X                  Fetal testing        Test Value Reference Range Date Time    NIPT        MSAFP        AFP-4                  2nd and 3rd Trimester       Test Value Reference Range Date Time    Hemoglobin (repeated)        Hematocrit (repeated)        Platelets         1 hour GTT    "      Antibody Screen (repeated)        3rd TM syphilis scrn (repeated)  RPR         3rd TM syphilis scrn (repeated) TP-Ab        3rd TM syphilis screen TB-Ab (FTA)        Syphilis cascade test TP-Ab (EIA)        Syphilis cascade TPPA        GTT Fasting        GTT 1 Hr        GTT 2 Hr        GTT 3 Hr        Group B Strep                  Other testing        Test Value Reference Range Date Time    Parvo IgG         CMV IgG                   Drug Screening       Test Value Reference Range Date Time    Amphetamine Screen        Barbiturate Screen        Benzodiazepine Screen        Methadone Screen        Phencyclidine Screen        Opiates Screen        THC Screen        Cocaine Screen        Propoxyphene Screen        Buprenorphine Screen        Methamphetamine Screen        Oxycodone Screen        Tricyclic Antidepressants Screen                  Legend    ^: Historical                          External Prenatal Results    The patient does not have a working LONG. Some results will not display without a working LONG.   Pregnancy Outside Results - Transcribed From Office Records - See Scanned Records For Details       Test Value Date Time    ABO  A  07/05/25 2329    Rh  Negative  07/05/25 2329    Antibody Screen       Varicella IgG       Rubella       Hgb       Hct       HgB A1c        1h GTT       3h GTT Fasting       3h GTT 1 hour       3h GTT 2 hour       3h GTT 3 hour        Gonorrhea (discrete)       Chlamydia (discrete)       RPR       Syphils cascade: TP-Ab (FTA)       TP-Ab       TP-Ab (EIA)       TPPA       HBsAg       Herpes Simplex Virus PCR       Herpes Simplex VIrus Culture       HIV       Hep C RNA Quant PCR       Hep C Antibody       AFP       NIPT       Cystic Fibrosis ([place]ia)       Cystic Fibroisis        Spinal Muscular atrophy       Fragile X       Group B Strep       GBS Susceptibility to Clindamycin       GBS Susceptibility to Erythromycin       Fetal Fibronectin       Genetic Testing, Maternal  Blood                 Drug Screening       Test Value Date Time    Urine Drug Screen       Amphetamine Screen       Barbiturate Screen       Benzodiazepine Screen       Methadone Screen       Phencyclidine Screen       Opiates Screen       THC Screen       Cocaine Screen       Propoxyphene Screen       Buprenorphine Screen       Methamphetamine Screen       Oxycodone Screen       Tricyclic Antidepressants Screen                 Legend    ^: Historical                             Past OB History:     OB History    Para Term  AB Living   4 3 3 0 0 3   SAB IAB Ectopic Molar Multiple Live Births   0 0 0 0 0 3      # Outcome Date GA Lbr Abimael/2nd Weight Sex Type Anes PTL Lv   4 Current            3 Term 25 37w0d  3317 g (7 lb 5 oz) M CS-LTranv Spinal N ZACHERY      Birth Comments: HC 36cm      Name: Sanjay Carver      Apgar1: 7  Apgar5: 7   2 Term 17 38w0d  3070 g (6 lb 12.3 oz) F CS-LTranv Spinal N ZACHERY      Name: PITO CARVER      Apgar1: 8  Apgar5: 9   1 Term 14 38w3d   F CS-LTranv EPI N ZACHERY      Complications: Fetal Intolerance       Past Medical History: Past Medical History:   Diagnosis Date    Abnormal cervical Papanicolaou smear 2021    Depression     Postpartum     Gestational hypertension     Herpes     HSV 1 +      Past Surgical History Past Surgical History:   Procedure Laterality Date     SECTION           SECTION Bilateral 2017    Procedure:  SECTION REPEAT;  Surgeon: Brittaney Sheppard MD;  Location: [facility] LABOR DELIVERY;  Service:      SECTION Bilateral 2025    Procedure:  SECTION REPEAT WITH SALPINGECTOMY;  Surgeon: Ana Ortiz MD;  Location: [facility] LABOR DELIVERY;  Service: Obstetrics/Gynecology;  Laterality: Bilateral;    CHOLECYSTECTOMY N/A 10/27/2017    Procedure: CHOLECYSTECTOMY LAPAROSCOPIC;  Surgeon: Carolee Sanchez MD;  Location: [facility] OR;  Service:       Family History: Family History    Problem Relation Age of Onset    Multiple sclerosis Father     Hypertension Paternal Grandmother     Hypertension Maternal Grandmother     Diabetes Maternal Grandfather       Social History:  reports that she quit smoking about 16 years ago. Her smoking use included cigarettes. She has never used smokeless tobacco.   reports no history of alcohol use.   reports current drug use. Drug: Marijuana.           High Risk Medical Conditions/Complaints this visit: recently postsurgical/postop    Discussion of Management or Test Interpretation with physician/provider/healthcare provider: yes, d/w Dr. Jimenez who will discuss with Dr. Ji    External Records Reviewed: [place] [facility] [place] [facility] [place] - ANIRUDH Ortiz reviewed, pregnancy complicated by:     Review Previous Test Results: Prenatal labs in [facility] reviewed, history of: pre-eclampsia with magnesium postoperatively x 24h    Independent Historian: None    Social Determinants to Health: No drug, transportation or housing or other issues noted       Objective   Medical Decision Making:  Amount/Complexity of Data Reviewed  -Labs ordered - yes  -Imaging ordered - CT scan  -IV fluids given - yes  Risk:  Prescription drug management - labetalol  Drug therapy requiring intensive monitoring for toxicity - tramadol  Decision to admit patient - no  Diagnosis/Treatment limited by social determinants    Min/max vitals past 24 hours:  Temp  Min: 98.5 °F (36.9 °C)  Max: 98.5 °F (36.9 °C)   BP  Min: 130/85  Max: 173/87   Pulse  Min: 65  Max: 116   Resp  Min: 16  Max: 16        Abdomen: Soft, significantly tender in LLQ, no rebound, +guarding, no masses   Incision:      Review of current test: results Clean, dry & intact with no fluctuance or masses palpated, incision itself in nontender    Normal PIH labs/CT scan/CBC         Final Diagnoses: Normal postop course, no infection, normal CT scan or pre-eclampsia       Assessment   12 days postop  LLQ pain  History of  pre-eclampsia     Plan   OK for discharge  Follow up with Primary OB    Brittaney Sheppard MD  7/16/2025  16:46 CDT

## 2025-07-16 NOTE — PROGRESS NOTES
Subjective   Chief Complaint   Patient presents with    Postpartum Care     Pt here for 2 week, Postpartum visit CS w/ Dr. Ortiz. Breastfeeding. EPDS: . Pt reports severe pain on left side of abdomen, swelling on left side of incision and in LLE. Pt has no noticed any drainage.      Sheila Yeung is a 34 y.o. year old  presenting to be seen for a postpartum visit for postoperative wound and postpartum mood assessment.  She had a Repeat  (LTCS) with bilateral salpingectomy.   Prenatal course was notable for a previous history of eclamptic seizures in a previous pregnancy and two previous  deliveries.      History of Present Illness  The patient is a 34-year-old female who presents for a postoperative visit at 12 days following a repeat  with a bilateral salpingectomy.    She reports experiencing severe pain, particularly on the left side. Initially, she felt an improvement in her condition until a couple days ago, but the pain has since worsened. Tender to touch. She has resumed taking Norco for pain management, which had been prescribed at discharge, and took her last dose before this visit. Her urinary function is normal, and she has bowel movements at least once daily. She is able to pass gas and eat without any issues. She reports no nausea, vomiting, or fever. Additionally, she notes that the swelling in her left leg has not subsided, unlike her right leg. Her bleeding has decreased, and she has not noticed any blood clots.     She is breastfeeding successfully and reports that her baby is doing well, having gained weight since birth.      The following portions of the patient's history were reviewed and updated as appropriate: problem list, current medications, and allergies    Social History     Socioeconomic History    Marital status:      Spouse name: Kash   Tobacco Use    Smoking status: Former     Current packs/day: 0.00     Types: Cigarettes     Quit  "date:      Years since quittin.5    Smokeless tobacco: Never   Vaping Use    Vaping status: Former    Substances: Nicotine    Devices: Pre-filled or refillable cartridge   Substance and Sexual Activity    Alcohol use: No    Drug use: Yes     Types: Marijuana     Comment: roll    Sexual activity: Yes     Partners: Male     Birth control/protection: None     Review of Systems   Constitutional:  Negative for fatigue and fever.   Eyes:  Negative for visual disturbance.   Respiratory:  Negative for shortness of breath.    Cardiovascular:  Negative for chest pain and leg swelling.   Gastrointestinal:  Positive for abdominal pain (LLQ, sharp, increased over the last couple of days). Negative for constipation and diarrhea.   Endocrine: Negative for cold intolerance and heat intolerance.   Genitourinary:  Positive for pelvic pain (Left side) and vaginal bleeding. Negative for difficulty urinating, dysuria and vaginal discharge.   Musculoskeletal:  Negative for back pain.   Skin:  Positive for wound (incision site to abdomen). Negative for rash.   Neurological:  Negative for dizziness and headaches.   Psychiatric/Behavioral:  Negative for dysphoric mood, self-injury and suicidal ideas. The patient is not nervous/anxious.          Objective   /90 (BP Location: Left arm, Patient Position: Sitting, Cuff Size: Adult)   Ht 158.8 cm (62.5\")   Wt 84.4 kg (186 lb)   Breastfeeding Yes   BMI 33.48 kg/m²     General:  well developed; well nourished  no acute distress  mentation appropriate   Abdomen: Soft abdomen with tenderness to palpation to left side of lower abdomen. No erythema, rash, drainage around the incision.    Extremities: Left lower leg with trace edema and right leg with no edema. Nontender calf. Negative denisa's sign   Pelvis: Not performed.     Postpartum Depression: Medium Risk (2025)    [place]ory  Depression Scale     Last EPDS Total Score: 6     Last EPDS Self Harm Result: Never "       Cervical cancer screening: none available and will defer to future visit.       Diagnoses and all orders for this visit:    1. Encounter for examination of surgical site (Primary)    2. Encounter for screening for maternal depression    3. Postpartum state    4. Lactating mother        Assessment & Plan  1. Postoperative status following a repeat  with bilateral salpingectomy.  - Severe pain on the left side, which has worsened over the past few days. No induration or fever. Regular bowel movements, though painful.  - Consulted with Dr. Jimenez who also performed an abdominal assessment. Discussed further assessment with patient a CT scan would be suggestive to rule out complications such as a hematoma.  - Patient will co to the emergency department for further evaluation of the pain, which may include CT scan and labs.   - Advised to maintain hydration and nutrition and continue taking prenatal or  vitamins while breast feeding.       Follow-up: 6-week postpartum visit with Dr. Ortiz is already scheduled.      This note was electronically signed.    Mariama Rondon DNP, SANJANA, CNM  2025    Patient or patient representative verbalized consent for the use of Ambient Listening during the visit with  SANJANA Shin for chart documentation. 2025  14:44 CDT

## 2025-07-21 ENCOUNTER — PATIENT OUTREACH (OUTPATIENT)
Dept: CALL CENTER | Facility: HOSPITAL | Age: 35
End: 2025-07-21
Payer: COMMERCIAL

## 2025-07-21 NOTE — OUTREACH NOTE
Motherhood Connection Survey      Flowsheet Row Responses   [facility] patient discharged from? [facility]   Week 1 attempt successful? Yes   Call start time 1246   Call end time 1250   Baby sex Boy    discharged home with mother? Yes   Baby sex Boy   Delivery type    Emotional state Acceptance   Family support Yes   Do you have all necessary resources to care for you and your baby?  Yes   Have members of your household adjusted to your baby? Yes   Did you have any problems with pre-eclampsia during this pregnancy? Yes   Do you have any of the following: Headache, Vision changes, Swelling   Did you have blood glucose issues during this pregnancy No   Lochia amount Light   Lochia per patient report Alba   Did you have an episiotomy/tear/abdominal incision? Yes   Feeding Method Breast   Frequency all the time   Breast Condition No   Nipple Condition No   Nursing Interventions Supportive bra   Feeding tolerance Wet/dirty diapers   Number of wet diapers x 24 hours 12   Last BM x 24 hours 10   Umbilical Cord No reported signs or symptoms   Was the baby circumcised? Yes   Circumcision care and signs/symptoms to report Reviewed   Where does the baby usually sleep? Bassinet   Are there stuffed animals, toys, pillows, quilts, blankets, wedges, positioners, bumpers or other loose bedding in the infant's sleeping environment? No   Does the baby ever share a sleep surface in a bed, couch, recliner or other? No   What position do you lay your baby down to sleep? Back   Are you and/or other caregivers smoking inside or outside the baby's home? No   Mom appointment comments: has had f/u was put back in hospital   Baby appointment comments: peds appt x 2   Call completed? Yes   How satisfied were you with the Motherhood Connection Program? 5   Anyone you would like to recognize from your time in the Motherhood Connection Program Ximena RUFF - Registered Nurse

## 2025-07-21 NOTE — OUTREACH NOTE
Motherhood Connection Survey      Flowsheet Row Responses   [facility] facility patient discharged from? [facility]   Week 1 attempt successful? No   Unsuccessful attempts Attempt 1   Reschedule Today  [left vm]              BEBE RUFF - Registered Nurse

## 2025-08-20 ENCOUNTER — POSTPARTUM VISIT (OUTPATIENT)
Dept: OBSTETRICS AND GYNECOLOGY | Age: 35
End: 2025-08-20
Payer: COMMERCIAL

## 2025-08-20 VITALS
SYSTOLIC BLOOD PRESSURE: 126 MMHG | BODY MASS INDEX: 36.62 KG/M2 | DIASTOLIC BLOOD PRESSURE: 100 MMHG | HEIGHT: 62 IN | WEIGHT: 199 LBS

## (undated) DEVICE — SUT VIC 0 SUTUPAK TIES 18IN J906G

## (undated) DEVICE — ENDOPOUCH RETRIEVER SPECIMEN RETRIEVAL BAGS: Brand: ENDOPOUCH RETRIEVER

## (undated) DEVICE — ELECTRD L HK EZ CLN 33CM

## (undated) DEVICE — DRSNG BRDR MEPILEX P/OP SIL 4X12IN

## (undated) DEVICE — ANTIBACTERIAL UNDYED BRAIDED (POLYGLACTIN 910), SYNTHETIC ABSORBABLE SUTURE: Brand: COATED VICRYL

## (undated) DEVICE — KENDALL SCD EXPRESS SLEEVES, KNEE LENGTH, LARGE: Brand: KENDALL SCD

## (undated) DEVICE — SUT VIC 0 CT1 36IN J946H

## (undated) DEVICE — LARGE, DISPOSABLE C-SECTION RETRACTOR: Brand: ALEXIS ® O C-SECTION PROTECTOR/RETRACTOR

## (undated) DEVICE — PAD C-SECTION: Brand: MEDLINE INDUSTRIES, INC.

## (undated) DEVICE — GLV SURG TRIUMPH ORTHO W/ALOE PF LTX 7.0

## (undated) DEVICE — 3M™ STERI-STRIP™ REINFORCED ADHESIVE SKIN CLOSURES, R1547, 1/2 IN X 4 IN (12 MM X 100 MM), 6 STRIPS/ENVELOPE: Brand: 3M™ STERI-STRIP™

## (undated) DEVICE — Device

## (undated) DEVICE — PK TURNOVER RM ADV

## (undated) DEVICE — PAD LAP CHOLE: Brand: MEDLINE INDUSTRIES, INC.

## (undated) DEVICE — 2, DISPOSABLE SUCTION/IRRIGATOR WITHOUT DISPOSABLE TIP: Brand: STRYKEFLOW

## (undated) DEVICE — ENDOPATH XCEL WITH OPTIVIEW TECHNOLOGY UNIVERSAL TROCAR STABILITY SLEEVES: Brand: ENDOPATH XCEL OPTIVIEW

## (undated) DEVICE — APPL CHLORAPREP HI/LITE 26ML ORNG

## (undated) DEVICE — ENDOPATH PNEUMONEEDLE INSUFFLATION NEEDLES WITH LUER LOCK CONNECTORS 120MM: Brand: ENDOPATH

## (undated) DEVICE — APPL CHLORAPREP W/TINT 26ML ORNG

## (undated) DEVICE — ENDOPATH XCEL WITH OPTIVIEW TECHNOLOGY DILATING TIP TROCARS WITH STABILITY SLEEVES: Brand: ENDOPATH XCEL OPTIVIEW

## (undated) DEVICE — TRY PREP SCRB VAG PVP

## (undated) DEVICE — PATIENT RETURN ELECTRODE, SINGLE-USE, CONTACT QUALITY MONITORING, ADULT, WITH 15 FT (4.5 M) CORD. FOR PATIENTS WEIGHING OVER 33LBS. (15KG): Brand: MEGADYNE

## (undated) DEVICE — MINI ENDOCUT SCISSOR TIP, DISPOSABLE: Brand: RENEW

## (undated) DEVICE — SLV SCD KN ADJ EXPRSS LG

## (undated) DEVICE — ADHS SKIN PREMIERPRO EXOFIN TOPICAL HI/VISC .5ML

## (undated) DEVICE — CVR HNDL LIGHT RIGID

## (undated) DEVICE — ELECTRD BLD EDGE/INSUL1P 2.4X5.1MM STRL

## (undated) DEVICE — SUT MNCRYL 4/0 PS2 27IN UD MCP426H

## (undated) DEVICE — GLOVE,SURG,SENSICARE SLT,LF,PF,7.5: Brand: MEDLINE

## (undated) DEVICE — GLV SURG BIOGEL LTX PF 6 1/2

## (undated) DEVICE — ADHS LIQ MASTISOL 2/3ML

## (undated) DEVICE — ANTIBACTERIAL VIOLET BRAIDED (POLYGLACTIN 910), SYNTHETIC ABSORBABLE SUTURE: Brand: COATED VICRYL

## (undated) DEVICE — PENCL SMOKE/EVAC MEGADYNE TELESCP 10FT

## (undated) DEVICE — ENDOPATH XCEL DILATING TIP TROCARS WITH STABILITY SLEEVES: Brand: ENDOPATH XCEL